# Patient Record
Sex: MALE | Race: WHITE | NOT HISPANIC OR LATINO | Employment: UNEMPLOYED | URBAN - METROPOLITAN AREA
[De-identification: names, ages, dates, MRNs, and addresses within clinical notes are randomized per-mention and may not be internally consistent; named-entity substitution may affect disease eponyms.]

---

## 2023-09-19 ENCOUNTER — HOSPITAL ENCOUNTER (INPATIENT)
Facility: CLINIC | Age: 63
LOS: 3 days | Discharge: LEFT BEFORE DISCHARGE | End: 2023-09-22
Attending: EMERGENCY MEDICINE | Admitting: INTERNAL MEDICINE
Payer: COMMERCIAL

## 2023-09-19 ENCOUNTER — APPOINTMENT (OUTPATIENT)
Dept: ULTRASOUND IMAGING | Facility: CLINIC | Age: 63
End: 2023-09-19
Attending: INTERNAL MEDICINE
Payer: COMMERCIAL

## 2023-09-19 ENCOUNTER — APPOINTMENT (OUTPATIENT)
Dept: GENERAL RADIOLOGY | Facility: CLINIC | Age: 63
End: 2023-09-19
Attending: EMERGENCY MEDICINE
Payer: COMMERCIAL

## 2023-09-19 DIAGNOSIS — M86.9 OSTEOMYELITIS OF GREAT TOE OF LEFT FOOT (H): ICD-10-CM

## 2023-09-19 LAB
ANION GAP SERPL CALCULATED.3IONS-SCNC: 15 MMOL/L (ref 7–15)
BASE EXCESS BLDV CALC-SCNC: 1.1 MMOL/L (ref -7.7–1.9)
BASOPHILS # BLD AUTO: 0.1 10E3/UL (ref 0–0.2)
BASOPHILS NFR BLD AUTO: 1 %
BUN SERPL-MCNC: 18.5 MG/DL (ref 8–23)
CALCIUM SERPL-MCNC: 9.8 MG/DL (ref 8.8–10.2)
CHLORIDE SERPL-SCNC: 96 MMOL/L (ref 98–107)
CREAT SERPL-MCNC: 0.79 MG/DL (ref 0.67–1.17)
DEPRECATED HCO3 PLAS-SCNC: 22 MMOL/L (ref 22–29)
EGFRCR SERPLBLD CKD-EPI 2021: >90 ML/MIN/1.73M2
EOSINOPHIL # BLD AUTO: 0.2 10E3/UL (ref 0–0.7)
EOSINOPHIL NFR BLD AUTO: 2 %
ERYTHROCYTE [DISTWIDTH] IN BLOOD BY AUTOMATED COUNT: 12.2 % (ref 10–15)
GLUCOSE SERPL-MCNC: 219 MG/DL (ref 70–99)
HCO3 BLDV-SCNC: 25 MMOL/L (ref 21–28)
HCT VFR BLD AUTO: 48.4 % (ref 40–53)
HGB BLD-MCNC: 17.3 G/DL (ref 13.3–17.7)
HOLD SPECIMEN: NORMAL
HOLD SPECIMEN: NORMAL
IMM GRANULOCYTES # BLD: 0.3 10E3/UL
IMM GRANULOCYTES NFR BLD: 2 %
LACTATE SERPL-SCNC: 1.5 MMOL/L (ref 0.7–2)
LACTATE SERPL-SCNC: 3.2 MMOL/L (ref 0.7–2)
LYMPHOCYTES # BLD AUTO: 2.6 10E3/UL (ref 0.8–5.3)
LYMPHOCYTES NFR BLD AUTO: 18 %
MCH RBC QN AUTO: 33 PG (ref 26.5–33)
MCHC RBC AUTO-ENTMCNC: 35.7 G/DL (ref 31.5–36.5)
MCV RBC AUTO: 92 FL (ref 78–100)
MONOCYTES # BLD AUTO: 0.9 10E3/UL (ref 0–1.3)
MONOCYTES NFR BLD AUTO: 6 %
NEUTROPHILS # BLD AUTO: 10.1 10E3/UL (ref 1.6–8.3)
NEUTROPHILS NFR BLD AUTO: 71 %
NRBC # BLD AUTO: 0 10E3/UL
NRBC BLD AUTO-RTO: 0 /100
O2/TOTAL GAS SETTING VFR VENT: 0 %
OXYHGB MFR BLDV: 72 % (ref 70–75)
PCO2 BLDV: 39 MM HG (ref 40–50)
PH BLDV: 7.43 [PH] (ref 7.32–7.43)
PLATELET # BLD AUTO: 233 10E3/UL (ref 150–450)
PO2 BLDV: 39 MM HG (ref 25–47)
POTASSIUM SERPL-SCNC: 4.4 MMOL/L (ref 3.4–5.3)
RBC # BLD AUTO: 5.24 10E6/UL (ref 4.4–5.9)
SODIUM SERPL-SCNC: 133 MMOL/L (ref 136–145)
WBC # BLD AUTO: 14.2 10E3/UL (ref 4–11)

## 2023-09-19 PROCEDURE — 73660 X-RAY EXAM OF TOE(S): CPT | Mod: LT

## 2023-09-19 PROCEDURE — 250N000011 HC RX IP 250 OP 636: Mod: JZ | Performed by: INTERNAL MEDICINE

## 2023-09-19 PROCEDURE — 36415 COLL VENOUS BLD VENIPUNCTURE: CPT | Performed by: STUDENT IN AN ORGANIZED HEALTH CARE EDUCATION/TRAINING PROGRAM

## 2023-09-19 PROCEDURE — 120N000001 HC R&B MED SURG/OB

## 2023-09-19 PROCEDURE — 80048 BASIC METABOLIC PNL TOTAL CA: CPT | Performed by: EMERGENCY MEDICINE

## 2023-09-19 PROCEDURE — 96365 THER/PROPH/DIAG IV INF INIT: CPT

## 2023-09-19 PROCEDURE — 250N000013 HC RX MED GY IP 250 OP 250 PS 637: Performed by: EMERGENCY MEDICINE

## 2023-09-19 PROCEDURE — 258N000003 HC RX IP 258 OP 636: Performed by: INTERNAL MEDICINE

## 2023-09-19 PROCEDURE — 99285 EMERGENCY DEPT VISIT HI MDM: CPT | Mod: 25

## 2023-09-19 PROCEDURE — 85025 COMPLETE CBC W/AUTO DIFF WBC: CPT | Performed by: STUDENT IN AN ORGANIZED HEALTH CARE EDUCATION/TRAINING PROGRAM

## 2023-09-19 PROCEDURE — 250N000011 HC RX IP 250 OP 636: Performed by: EMERGENCY MEDICINE

## 2023-09-19 PROCEDURE — 85025 COMPLETE CBC W/AUTO DIFF WBC: CPT | Performed by: EMERGENCY MEDICINE

## 2023-09-19 PROCEDURE — 87040 BLOOD CULTURE FOR BACTERIA: CPT | Performed by: EMERGENCY MEDICINE

## 2023-09-19 PROCEDURE — 82310 ASSAY OF CALCIUM: CPT | Performed by: STUDENT IN AN ORGANIZED HEALTH CARE EDUCATION/TRAINING PROGRAM

## 2023-09-19 PROCEDURE — 99223 1ST HOSP IP/OBS HIGH 75: CPT | Performed by: INTERNAL MEDICINE

## 2023-09-19 PROCEDURE — 36415 COLL VENOUS BLD VENIPUNCTURE: CPT | Performed by: EMERGENCY MEDICINE

## 2023-09-19 PROCEDURE — 83605 ASSAY OF LACTIC ACID: CPT | Performed by: INTERNAL MEDICINE

## 2023-09-19 PROCEDURE — 83605 ASSAY OF LACTIC ACID: CPT | Performed by: EMERGENCY MEDICINE

## 2023-09-19 PROCEDURE — 36415 COLL VENOUS BLD VENIPUNCTURE: CPT | Performed by: INTERNAL MEDICINE

## 2023-09-19 PROCEDURE — 258N000003 HC RX IP 258 OP 636: Performed by: EMERGENCY MEDICINE

## 2023-09-19 PROCEDURE — 82805 BLOOD GASES W/O2 SATURATION: CPT | Performed by: EMERGENCY MEDICINE

## 2023-09-19 PROCEDURE — 93926 LOWER EXTREMITY STUDY: CPT | Mod: LT

## 2023-09-19 PROCEDURE — 250N000013 HC RX MED GY IP 250 OP 250 PS 637: Performed by: INTERNAL MEDICINE

## 2023-09-19 RX ORDER — HYDROXYZINE HYDROCHLORIDE 25 MG/1
50 TABLET, FILM COATED ORAL EVERY 6 HOURS PRN
Status: DISCONTINUED | OUTPATIENT
Start: 2023-09-19 | End: 2023-09-22 | Stop reason: HOSPADM

## 2023-09-19 RX ORDER — AMOXICILLIN 250 MG
2 CAPSULE ORAL 2 TIMES DAILY PRN
Status: DISCONTINUED | OUTPATIENT
Start: 2023-09-19 | End: 2023-09-22 | Stop reason: HOSPADM

## 2023-09-19 RX ORDER — CEFTRIAXONE 1 G/1
1 INJECTION, POWDER, FOR SOLUTION INTRAMUSCULAR; INTRAVENOUS EVERY 24 HOURS
Status: DISCONTINUED | OUTPATIENT
Start: 2023-09-20 | End: 2023-09-20

## 2023-09-19 RX ORDER — POLYETHYLENE GLYCOL 3350 17 G/17G
17 POWDER, FOR SOLUTION ORAL DAILY PRN
Status: DISCONTINUED | OUTPATIENT
Start: 2023-09-19 | End: 2023-09-22 | Stop reason: HOSPADM

## 2023-09-19 RX ORDER — AMLODIPINE BESYLATE 10 MG/1
10 TABLET ORAL DAILY
Status: DISCONTINUED | OUTPATIENT
Start: 2023-09-20 | End: 2023-09-22 | Stop reason: HOSPADM

## 2023-09-19 RX ORDER — AMLODIPINE BESYLATE 5 MG/1
5 TABLET ORAL DAILY
COMMUNITY
Start: 2023-08-31 | End: 2024-08-30

## 2023-09-19 RX ORDER — GABAPENTIN 600 MG/1
1200 TABLET ORAL AT BEDTIME
Status: DISCONTINUED | OUTPATIENT
Start: 2023-09-19 | End: 2023-09-22 | Stop reason: HOSPADM

## 2023-09-19 RX ORDER — SODIUM CHLORIDE 9 MG/ML
INJECTION, SOLUTION INTRAVENOUS CONTINUOUS
Status: DISCONTINUED | OUTPATIENT
Start: 2023-09-19 | End: 2023-09-20

## 2023-09-19 RX ORDER — ONDANSETRON 4 MG/1
4 TABLET, ORALLY DISINTEGRATING ORAL EVERY 6 HOURS PRN
Status: DISCONTINUED | OUTPATIENT
Start: 2023-09-19 | End: 2023-09-21

## 2023-09-19 RX ORDER — DEXTROSE MONOHYDRATE 25 G/50ML
25-50 INJECTION, SOLUTION INTRAVENOUS
Status: DISCONTINUED | OUTPATIENT
Start: 2023-09-19 | End: 2023-09-22

## 2023-09-19 RX ORDER — CEFTRIAXONE 2 G/1
2 INJECTION, POWDER, FOR SOLUTION INTRAMUSCULAR; INTRAVENOUS ONCE
Status: COMPLETED | OUTPATIENT
Start: 2023-09-19 | End: 2023-09-19

## 2023-09-19 RX ORDER — AMOXICILLIN 250 MG
1 CAPSULE ORAL 2 TIMES DAILY PRN
Status: DISCONTINUED | OUTPATIENT
Start: 2023-09-19 | End: 2023-09-22 | Stop reason: HOSPADM

## 2023-09-19 RX ORDER — ASPIRIN 325 MG
325 TABLET ORAL DAILY
Status: DISCONTINUED | OUTPATIENT
Start: 2023-09-20 | End: 2023-09-19

## 2023-09-19 RX ORDER — TADALAFIL 20 MG/1
10-20 TABLET ORAL
Status: ON HOLD | COMMUNITY
End: 2023-09-22

## 2023-09-19 RX ORDER — NALOXONE HYDROCHLORIDE 0.4 MG/ML
0.2 INJECTION, SOLUTION INTRAMUSCULAR; INTRAVENOUS; SUBCUTANEOUS
Status: DISCONTINUED | OUTPATIENT
Start: 2023-09-19 | End: 2023-09-22 | Stop reason: HOSPADM

## 2023-09-19 RX ORDER — LEVOTHYROXINE SODIUM 137 UG/1
137 TABLET ORAL
Status: DISCONTINUED | OUTPATIENT
Start: 2023-09-21 | End: 2023-09-22 | Stop reason: HOSPADM

## 2023-09-19 RX ORDER — HYDRALAZINE HYDROCHLORIDE 25 MG/1
25 TABLET, FILM COATED ORAL 4 TIMES DAILY PRN
Status: DISCONTINUED | OUTPATIENT
Start: 2023-09-19 | End: 2023-09-22 | Stop reason: HOSPADM

## 2023-09-19 RX ORDER — DOXYCYCLINE 100 MG/1
100 CAPSULE ORAL 2 TIMES DAILY
Status: ON HOLD | COMMUNITY
Start: 2023-09-13 | End: 2023-09-22

## 2023-09-19 RX ORDER — ROSUVASTATIN CALCIUM 5 MG/1
5 TABLET, COATED ORAL DAILY
Status: DISCONTINUED | OUTPATIENT
Start: 2023-09-20 | End: 2023-09-22 | Stop reason: HOSPADM

## 2023-09-19 RX ORDER — GABAPENTIN 600 MG/1
600 TABLET ORAL DAILY PRN
COMMUNITY

## 2023-09-19 RX ORDER — ROSUVASTATIN CALCIUM 5 MG/1
5 TABLET, COATED ORAL
COMMUNITY
Start: 2023-06-26 | End: 2024-06-25

## 2023-09-19 RX ORDER — LEVOTHYROXINE SODIUM 100 UG/1
200 TABLET ORAL WEEKLY
Status: DISCONTINUED | OUTPATIENT
Start: 2023-09-20 | End: 2023-09-22 | Stop reason: HOSPADM

## 2023-09-19 RX ORDER — OXYCODONE HYDROCHLORIDE 5 MG/1
5 TABLET ORAL EVERY 4 HOURS PRN
Status: DISCONTINUED | OUTPATIENT
Start: 2023-09-19 | End: 2023-09-19

## 2023-09-19 RX ORDER — LEVOTHYROXINE SODIUM 137 UG/1
TABLET ORAL
COMMUNITY
Start: 2023-09-01

## 2023-09-19 RX ORDER — HYDROXYZINE HYDROCHLORIDE 25 MG/1
25 TABLET, FILM COATED ORAL EVERY 6 HOURS PRN
Status: DISCONTINUED | OUTPATIENT
Start: 2023-09-19 | End: 2023-09-22 | Stop reason: HOSPADM

## 2023-09-19 RX ORDER — SPIRONOLACTONE 50 MG/1
50 TABLET, FILM COATED ORAL 2 TIMES DAILY
COMMUNITY
Start: 2023-06-26

## 2023-09-19 RX ORDER — LIDOCAINE 40 MG/G
CREAM TOPICAL
Status: DISCONTINUED | OUTPATIENT
Start: 2023-09-19 | End: 2023-09-22 | Stop reason: HOSPADM

## 2023-09-19 RX ORDER — ACETAMINOPHEN 325 MG/1
975 TABLET ORAL 4 TIMES DAILY
Status: DISCONTINUED | OUTPATIENT
Start: 2023-09-19 | End: 2023-09-19

## 2023-09-19 RX ORDER — SPIRONOLACTONE 25 MG/1
50 TABLET ORAL 2 TIMES DAILY
Status: DISCONTINUED | OUTPATIENT
Start: 2023-09-19 | End: 2023-09-22 | Stop reason: DRUGHIGH

## 2023-09-19 RX ORDER — HYDROCODONE BITARTRATE AND ACETAMINOPHEN 5; 325 MG/1; MG/1
1-2 TABLET ORAL EVERY 6 HOURS PRN
Status: DISCONTINUED | OUTPATIENT
Start: 2023-09-19 | End: 2023-09-21

## 2023-09-19 RX ORDER — NALOXONE HYDROCHLORIDE 0.4 MG/ML
0.4 INJECTION, SOLUTION INTRAMUSCULAR; INTRAVENOUS; SUBCUTANEOUS
Status: DISCONTINUED | OUTPATIENT
Start: 2023-09-19 | End: 2023-09-22 | Stop reason: HOSPADM

## 2023-09-19 RX ORDER — ASPIRIN 325 MG
325 TABLET ORAL
COMMUNITY
End: 2023-09-19

## 2023-09-19 RX ORDER — CLINDAMYCIN PHOSPHATE 900 MG/50ML
900 INJECTION, SOLUTION INTRAVENOUS EVERY 8 HOURS
Status: DISCONTINUED | OUTPATIENT
Start: 2023-09-19 | End: 2023-09-20

## 2023-09-19 RX ORDER — GABAPENTIN 600 MG/1
1200 TABLET ORAL AT BEDTIME
COMMUNITY
Start: 2021-10-12

## 2023-09-19 RX ORDER — ONDANSETRON 2 MG/ML
4 INJECTION INTRAMUSCULAR; INTRAVENOUS EVERY 6 HOURS PRN
Status: DISCONTINUED | OUTPATIENT
Start: 2023-09-19 | End: 2023-09-21

## 2023-09-19 RX ORDER — NICOTINE 21 MG/24HR
1 PATCH, TRANSDERMAL 24 HOURS TRANSDERMAL DAILY
Status: DISCONTINUED | OUTPATIENT
Start: 2023-09-19 | End: 2023-09-22 | Stop reason: HOSPADM

## 2023-09-19 RX ORDER — NICOTINE POLACRILEX 4 MG
15-30 LOZENGE BUCCAL
Status: DISCONTINUED | OUTPATIENT
Start: 2023-09-19 | End: 2023-09-22

## 2023-09-19 RX ADMIN — NICOTINE 1 PATCH: 14 PATCH, EXTENDED RELEASE TRANSDERMAL at 18:36

## 2023-09-19 RX ADMIN — APIXABAN 5 MG: 5 TABLET, FILM COATED ORAL at 22:57

## 2023-09-19 RX ADMIN — SODIUM CHLORIDE 1000 ML: 9 INJECTION, SOLUTION INTRAVENOUS at 20:33

## 2023-09-19 RX ADMIN — CEFTRIAXONE 2 G: 2 INJECTION, POWDER, FOR SOLUTION INTRAMUSCULAR; INTRAVENOUS at 19:27

## 2023-09-19 RX ADMIN — VANCOMYCIN HYDROCHLORIDE 1750 MG: 5 INJECTION, POWDER, LYOPHILIZED, FOR SOLUTION INTRAVENOUS at 20:37

## 2023-09-19 RX ADMIN — CLINDAMYCIN PHOSPHATE 900 MG: 900 INJECTION, SOLUTION INTRAVENOUS at 23:52

## 2023-09-19 RX ADMIN — SODIUM CHLORIDE 1000 ML: 9 INJECTION, SOLUTION INTRAVENOUS at 19:19

## 2023-09-19 RX ADMIN — ACETAMINOPHEN 975 MG: 325 TABLET, FILM COATED ORAL at 22:56

## 2023-09-19 RX ADMIN — GABAPENTIN 1200 MG: 600 TABLET, FILM COATED ORAL at 23:11

## 2023-09-19 RX ADMIN — HYDRALAZINE HYDROCHLORIDE 25 MG: 25 TABLET, FILM COATED ORAL at 22:57

## 2023-09-19 RX ADMIN — SODIUM CHLORIDE: 9 INJECTION, SOLUTION INTRAVENOUS at 22:44

## 2023-09-19 RX ADMIN — SPIRONOLACTONE 50 MG: 25 TABLET ORAL at 23:11

## 2023-09-19 ASSESSMENT — ACTIVITIES OF DAILY LIVING (ADL)
DIFFICULTY_COMMUNICATING: NO
CONCENTRATING,_REMEMBERING_OR_MAKING_DECISIONS_DIFFICULTY: NO
DRESSING/BATHING_DIFFICULTY: NO
CHANGE_IN_FUNCTIONAL_STATUS_SINCE_ONSET_OF_CURRENT_ILLNESS/INJURY: NO
WALKING_OR_CLIMBING_STAIRS_DIFFICULTY: NO
DOING_ERRANDS_INDEPENDENTLY_DIFFICULTY: NO
DIFFICULTY_EATING/SWALLOWING: NO
ADLS_ACUITY_SCORE: 35
HEARING_DIFFICULTY_OR_DEAF: NO
FALL_HISTORY_WITHIN_LAST_SIX_MONTHS: NO
ADLS_ACUITY_SCORE: 35
ADLS_ACUITY_SCORE: 35
WEAR_GLASSES_OR_BLIND: YES
TOILETING_ISSUES: NO

## 2023-09-19 NOTE — ED TRIAGE NOTES
Pt. Presents to ED with complaints of L great toe infection that started last Monday, was seen at [facility] on Tuesday and was given ABX which he started on Wednesday. On the 6th day of ABX today. Using iodine and triple antibiotic and bandaging. Reports still draining pus and blood and remains swollen. AVSS on RA. A & O x 4, independent. Denies fevers. Diabetic.

## 2023-09-20 ENCOUNTER — APPOINTMENT (OUTPATIENT)
Dept: MRI IMAGING | Facility: CLINIC | Age: 63
End: 2023-09-20
Attending: INTERNAL MEDICINE
Payer: COMMERCIAL

## 2023-09-20 ENCOUNTER — ANESTHESIA (OUTPATIENT)
Dept: SURGERY | Facility: CLINIC | Age: 63
End: 2023-09-20
Payer: COMMERCIAL

## 2023-09-20 ENCOUNTER — ANESTHESIA EVENT (OUTPATIENT)
Dept: SURGERY | Facility: CLINIC | Age: 63
End: 2023-09-20
Payer: COMMERCIAL

## 2023-09-20 ENCOUNTER — APPOINTMENT (OUTPATIENT)
Dept: ULTRASOUND IMAGING | Facility: CLINIC | Age: 63
End: 2023-09-20
Attending: HOSPITALIST
Payer: COMMERCIAL

## 2023-09-20 LAB
ANION GAP SERPL CALCULATED.3IONS-SCNC: 6 MMOL/L (ref 7–15)
APTT PPP: 40 SECONDS (ref 22–38)
BUN SERPL-MCNC: 12.3 MG/DL (ref 8–23)
CALCIUM SERPL-MCNC: 9 MG/DL (ref 8.8–10.2)
CHLORIDE SERPL-SCNC: 105 MMOL/L (ref 98–107)
CREAT SERPL-MCNC: 0.86 MG/DL (ref 0.67–1.17)
DEPRECATED HCO3 PLAS-SCNC: 25 MMOL/L (ref 22–29)
EGFRCR SERPLBLD CKD-EPI 2021: >90 ML/MIN/1.73M2
ERYTHROCYTE [DISTWIDTH] IN BLOOD BY AUTOMATED COUNT: 12.3 % (ref 10–15)
ERYTHROCYTE [DISTWIDTH] IN BLOOD BY AUTOMATED COUNT: 12.4 % (ref 10–15)
GLUCOSE BLDC GLUCOMTR-MCNC: 129 MG/DL (ref 70–99)
GLUCOSE BLDC GLUCOMTR-MCNC: 130 MG/DL (ref 70–99)
GLUCOSE BLDC GLUCOMTR-MCNC: 139 MG/DL (ref 70–99)
GLUCOSE BLDC GLUCOMTR-MCNC: 151 MG/DL (ref 70–99)
GLUCOSE BLDC GLUCOMTR-MCNC: 205 MG/DL (ref 70–99)
GLUCOSE BLDC GLUCOMTR-MCNC: 219 MG/DL (ref 70–99)
GLUCOSE SERPL-MCNC: 140 MG/DL (ref 70–99)
HCT VFR BLD AUTO: 43.2 % (ref 40–53)
HCT VFR BLD AUTO: 44.6 % (ref 40–53)
HGB BLD-MCNC: 14.5 G/DL (ref 13.3–17.7)
HGB BLD-MCNC: 15.4 G/DL (ref 13.3–17.7)
LACTATE SERPL-SCNC: 2.3 MMOL/L (ref 0.7–2)
MCH RBC QN AUTO: 32.6 PG (ref 26.5–33)
MCH RBC QN AUTO: 32.7 PG (ref 26.5–33)
MCHC RBC AUTO-ENTMCNC: 33.6 G/DL (ref 31.5–36.5)
MCHC RBC AUTO-ENTMCNC: 34.5 G/DL (ref 31.5–36.5)
MCV RBC AUTO: 95 FL (ref 78–100)
MCV RBC AUTO: 97 FL (ref 78–100)
MRSA DNA SPEC QL NAA+PROBE: NEGATIVE
PLATELET # BLD AUTO: 200 10E3/UL (ref 150–450)
PLATELET # BLD AUTO: 210 10E3/UL (ref 150–450)
POTASSIUM SERPL-SCNC: 4.9 MMOL/L (ref 3.4–5.3)
RBC # BLD AUTO: 4.45 10E6/UL (ref 4.4–5.9)
RBC # BLD AUTO: 4.71 10E6/UL (ref 4.4–5.9)
SA TARGET DNA: NEGATIVE
SODIUM SERPL-SCNC: 136 MMOL/L (ref 136–145)
WBC # BLD AUTO: 14 10E3/UL (ref 4–11)
WBC # BLD AUTO: 15.1 10E3/UL (ref 4–11)

## 2023-09-20 PROCEDURE — 36415 COLL VENOUS BLD VENIPUNCTURE: CPT | Performed by: HOSPITALIST

## 2023-09-20 PROCEDURE — 255N000002 HC RX 255 OP 636: Performed by: INTERNAL MEDICINE

## 2023-09-20 PROCEDURE — 250N000011 HC RX IP 250 OP 636: Performed by: INTERNAL MEDICINE

## 2023-09-20 PROCEDURE — A9585 GADOBUTROL INJECTION: HCPCS | Performed by: INTERNAL MEDICINE

## 2023-09-20 PROCEDURE — 250N000013 HC RX MED GY IP 250 OP 250 PS 637: Performed by: INTERNAL MEDICINE

## 2023-09-20 PROCEDURE — 120N000001 HC R&B MED SURG/OB

## 2023-09-20 PROCEDURE — 250N000011 HC RX IP 250 OP 636: Mod: JZ | Performed by: HOSPITALIST

## 2023-09-20 PROCEDURE — 99233 SBSQ HOSP IP/OBS HIGH 50: CPT | Performed by: HOSPITALIST

## 2023-09-20 PROCEDURE — 85027 COMPLETE CBC AUTOMATED: CPT | Performed by: INTERNAL MEDICINE

## 2023-09-20 PROCEDURE — 99418 PROLNG IP/OBS E/M EA 15 MIN: CPT | Performed by: HOSPITALIST

## 2023-09-20 PROCEDURE — 87641 MR-STAPH DNA AMP PROBE: CPT | Performed by: HOSPITALIST

## 2023-09-20 PROCEDURE — 93922 UPR/L XTREMITY ART 2 LEVELS: CPT

## 2023-09-20 PROCEDURE — 36415 COLL VENOUS BLD VENIPUNCTURE: CPT | Performed by: INTERNAL MEDICINE

## 2023-09-20 PROCEDURE — 82310 ASSAY OF CALCIUM: CPT | Performed by: INTERNAL MEDICINE

## 2023-09-20 PROCEDURE — 999N000141 HC STATISTIC PRE-PROCEDURE NURSING ASSESSMENT: Performed by: PODIATRIST

## 2023-09-20 PROCEDURE — 85014 HEMATOCRIT: CPT | Performed by: HOSPITALIST

## 2023-09-20 PROCEDURE — 85730 THROMBOPLASTIN TIME PARTIAL: CPT | Performed by: HOSPITALIST

## 2023-09-20 PROCEDURE — 99254 IP/OBS CNSLTJ NEW/EST MOD 60: CPT | Performed by: PODIATRIST

## 2023-09-20 PROCEDURE — 83605 ASSAY OF LACTIC ACID: CPT | Performed by: INTERNAL MEDICINE

## 2023-09-20 PROCEDURE — 73720 MRI LWR EXTREMITY W/O&W/DYE: CPT | Mod: LT

## 2023-09-20 PROCEDURE — 73720 MRI LWR EXTREMITY W/O&W/DYE: CPT | Mod: 26 | Performed by: RADIOLOGY

## 2023-09-20 RX ORDER — PIPERACILLIN SODIUM, TAZOBACTAM SODIUM 3; .375 G/15ML; G/15ML
3.38 INJECTION, POWDER, LYOPHILIZED, FOR SOLUTION INTRAVENOUS EVERY 6 HOURS
Status: DISCONTINUED | OUTPATIENT
Start: 2023-09-20 | End: 2023-09-22 | Stop reason: HOSPADM

## 2023-09-20 RX ORDER — HEPARIN SODIUM 10000 [USP'U]/100ML
0-5000 INJECTION, SOLUTION INTRAVENOUS CONTINUOUS
Status: DISCONTINUED | OUTPATIENT
Start: 2023-09-20 | End: 2023-09-22 | Stop reason: HOSPADM

## 2023-09-20 RX ORDER — VANCOMYCIN HYDROCHLORIDE 1 G/200ML
1000 INJECTION, SOLUTION INTRAVENOUS EVERY 12 HOURS
Status: DISCONTINUED | OUTPATIENT
Start: 2023-09-20 | End: 2023-09-22

## 2023-09-20 RX ORDER — GADOBUTROL 604.72 MG/ML
7 INJECTION INTRAVENOUS ONCE
Status: COMPLETED | OUTPATIENT
Start: 2023-09-20 | End: 2023-09-20

## 2023-09-20 RX ORDER — SODIUM CHLORIDE, SODIUM LACTATE, POTASSIUM CHLORIDE, CALCIUM CHLORIDE 600; 310; 30; 20 MG/100ML; MG/100ML; MG/100ML; MG/100ML
INJECTION, SOLUTION INTRAVENOUS CONTINUOUS
Status: DISCONTINUED | OUTPATIENT
Start: 2023-09-20 | End: 2023-09-20 | Stop reason: HOSPADM

## 2023-09-20 RX ADMIN — GABAPENTIN 1200 MG: 600 TABLET, FILM COATED ORAL at 21:06

## 2023-09-20 RX ADMIN — HYDROCODONE BITARTRATE AND ACETAMINOPHEN 1 TABLET: 5; 325 TABLET ORAL at 06:32

## 2023-09-20 RX ADMIN — HYDROCODONE BITARTRATE AND ACETAMINOPHEN 1 TABLET: 5; 325 TABLET ORAL at 16:17

## 2023-09-20 RX ADMIN — PIPERACILLIN AND TAZOBACTAM 3.38 G: 3; .375 INJECTION, POWDER, FOR SOLUTION INTRAVENOUS at 09:25

## 2023-09-20 RX ADMIN — HEPARIN SODIUM 900 UNITS/HR: 10000 INJECTION, SOLUTION INTRAVENOUS at 09:40

## 2023-09-20 RX ADMIN — LEVOTHYROXINE SODIUM 200 MCG: 0.1 TABLET ORAL at 09:13

## 2023-09-20 RX ADMIN — GADOBUTROL 7 ML: 604.72 INJECTION INTRAVENOUS at 08:13

## 2023-09-20 RX ADMIN — ROSUVASTATIN CALCIUM 5 MG: 5 TABLET, FILM COATED ORAL at 09:14

## 2023-09-20 RX ADMIN — VANCOMYCIN HYDROCHLORIDE 1000 MG: 1 INJECTION, SOLUTION INTRAVENOUS at 10:06

## 2023-09-20 RX ADMIN — SPIRONOLACTONE 50 MG: 25 TABLET ORAL at 09:13

## 2023-09-20 RX ADMIN — PIPERACILLIN AND TAZOBACTAM 3.38 G: 3; .375 INJECTION, POWDER, FOR SOLUTION INTRAVENOUS at 22:16

## 2023-09-20 RX ADMIN — AMLODIPINE BESYLATE 10 MG: 10 TABLET ORAL at 09:13

## 2023-09-20 RX ADMIN — HYDROXYZINE HYDROCHLORIDE 25 MG: 25 TABLET, FILM COATED ORAL at 19:32

## 2023-09-20 RX ADMIN — PIPERACILLIN AND TAZOBACTAM 3.38 G: 3; .375 INJECTION, POWDER, FOR SOLUTION INTRAVENOUS at 16:15

## 2023-09-20 RX ADMIN — CLINDAMYCIN PHOSPHATE 900 MG: 900 INJECTION, SOLUTION INTRAVENOUS at 06:32

## 2023-09-20 RX ADMIN — VANCOMYCIN HYDROCHLORIDE 1000 MG: 1 INJECTION, SOLUTION INTRAVENOUS at 20:48

## 2023-09-20 RX ADMIN — NICOTINE 1 PATCH: 14 PATCH, EXTENDED RELEASE TRANSDERMAL at 09:12

## 2023-09-20 ASSESSMENT — ACTIVITIES OF DAILY LIVING (ADL)
ADLS_ACUITY_SCORE: 20

## 2023-09-20 ASSESSMENT — LIFESTYLE VARIABLES: TOBACCO_USE: 1

## 2023-09-20 NOTE — PHARMACY-ADMISSION MEDICATION HISTORY
Pharmacist Admission Medication History    Admission medication history is complete. The information provided in this note is only as accurate as the sources available at the time of the update.    Medication reconciliation/reorder completed by provider prior to medication history? No    Information Source(s): Patient via in-person    Pertinent Information:     Changes made to PTA medication list:  Added: Doxycycline, Tadalafil prn  Deleted: ASA  Changed: Gabapentin    Medication Affordability:     Allergies reviewed with patient and updates made in EHR: no      Medication History Completed By: Ko Champion RPH 9/19/2023 10:43 PM    Prior to Admission medications    Medication Sig Last Dose Taking? Auth Provider Long Term End Date   amLODIPine (NORVASC) 5 MG tablet Take 5 mg by mouth daily 9/19/2023 at am Yes Reported, Patient Yes 8/30/24   apixaban ANTICOAGULANT (ELIQUIS) 5 MG tablet Take 5 mg by mouth 2 times daily 9/19/2023 at am Yes Reported, Patient     doxycycline hyclate (VIBRAMYCIN) 100 MG capsule Take 100 mg by mouth 2 times daily 9/19/2023 at am Yes Unknown, Entered By History  9/22/23   empagliflozin (JARDIANCE) 25 MG TABS tablet Take 25 mg by mouth daily 9/19/2023 Yes Reported, Patient     gabapentin (NEURONTIN) 600 MG tablet Take 1,200 mg by mouth At Bedtime 9/18/2023 Yes Reported, Patient Yes    gabapentin (NEURONTIN) 600 MG tablet Take 600 mg by mouth daily as needed  Yes Unknown, Entered By History Yes    levothyroxine (SYNTHROID/LEVOTHROID) 137 MCG tablet Take 137mcg daily and add 1/2 tab once per week (Wed) 9/19/2023 at am Yes Reported, Patient Yes    metFORMIN (GLUCOPHAGE) 500 MG tablet Take 1,000 mg by mouth 2 times daily (with meals) 9/19/2023 at am Yes Reported, Patient Yes    rosuvastatin (CRESTOR) 5 MG tablet Take 5 mg by mouth daily (with breakfast) 9/19/2023 at am Yes Reported, Patient Yes 6/25/24   spironolactone (ALDACTONE) 50 MG tablet Take 50 mg by mouth 2 times daily  9/19/2023 at am Yes Reported, Patient Yes    tadalafil (ADCIRCA/CIALIS) 20 MG tablet Take 10-20 mg by mouth every 48 hours as needed TAKE 1/2 TO 1 (ONE-HALF TO ONE) TABLET BY MOUTH AS NEEDED 1-2 HOURS PRIOR TO SEXUAL ACTIVITY - NO MORE THAN 1 TABLET IN 48 HOURS. DO NOT COMBINE WITH NITRATES.   Unknown, Entered By History Yes

## 2023-09-20 NOTE — PROGRESS NOTES
"Podiatry Progress Note - UPDATE   -Xrs reviewed showing gas to distal hallux. Please keep patient NPO for scheduled amputation today.     -Chart reviewed regarding patient's peripheral arterial disease. Agree that transfer to [facility], to follow with current vascular surgeons is most appropriate. Would transfer after today's procedure. Plan for amputation and debridement for infection control. Will ultimately need second debridement and closure following vascular evaluation/optimization.      Muriel Moses DPM       Addendum 1210:   -Patient unwilling to come to preop to discuss foot infection.   -Phone call placed to patient where he states he would not be agreeable to an amputation. He's also not agreeable to any surgical intervention without \"being seen in his room.\" Discussed with him signs of sepsis and gas gangrene on xray and recommended expedited treatment with evaluation in preop prior to procedure. He declines this.   -Patient ended phone yelling at myself and hung up on me. States he'll leave AMA.   "

## 2023-09-20 NOTE — ED NOTES
"[facility]  ED Nurse Handoff Report    ED Chief complaint: Wound Check  . ED Diagnosis:   Final diagnoses:   Osteomyelitis of great toe of left foot (H)       Allergies:   Allergies   Allergen Reactions    Adenosine Anaphylaxis and Other (See Comments)     PN: Became unresponsive, unable to speak    Dobutamine Other (See Comments)     Fainting    Atorvastatin Muscle Pain (Myalgia)    Ibuprofen Other (See Comments)     tinnitus       Code Status: Full Code    Activity level - Baseline/Home:  independent.  Activity Level - Current:   independent.   Lift room needed: No.   Bariatric: No   Needed: No   Isolation: No.   Infection: Not Applicable.     Respiratory status: Room air    Vital Signs (within 30 minutes):   Vitals:    09/19/23 1717 09/19/23 1720 09/19/23 1900 09/19/23 1915   BP:  (!) 177/93 (!) 191/105 (!) 175/86   BP Location:  Right arm     Pulse:  78 72 67   Resp: 16 16     Temp: 97.5  F (36.4  C)      TempSrc: Temporal      SpO2:  97% 98% 98%   Weight:  76.2 kg (168 lb)     Height:  1.702 m (5' 7\")         Cardiac Rhythm:  ,      Pain level:    Patient confused: No.   Patient Falls Risk: nonskid shoes/slippers when out of bed, arm band in place, patient and family education, and activity supervised.   Elimination Status: Has voided     Patient Report - Initial Complaint: Wound on foot.   Focused Assessment: Pt. Presents to ED with complaints of L great toe infection that started last Monday, was seen at [facility] on Tuesday and was given ABX which he started on Wednesday. On the 6th day of ABX today. Using iodine and triple antibiotic and bandaging. Reports still draining pus and blood and remains swollen. AVSS on RA. A & O x 4, independent. Denies fevers. Diabetic.       Abnormal Results:   Labs Ordered and Resulted from Time of ED Arrival to Time of ED Departure   BASIC METABOLIC PANEL - Abnormal       Result Value    Sodium 133 (*)     Potassium 4.4      Chloride 96 " (*)     Carbon Dioxide (CO2) 22      Anion Gap 15      Urea Nitrogen 18.5      Creatinine 0.79      Calcium 9.8      Glucose 219 (*)     GFR Estimate >90     CBC WITH PLATELETS AND DIFFERENTIAL - Abnormal    WBC Count 14.2 (*)     RBC Count 5.24      Hemoglobin 17.3      Hematocrit 48.4      MCV 92      MCH 33.0      MCHC 35.7      RDW 12.2      Platelet Count 233      % Neutrophils 71      % Lymphocytes 18      % Monocytes 6      % Eosinophils 2      % Basophils 1      % Immature Granulocytes 2      NRBCs per 100 WBC 0      Absolute Neutrophils 10.1 (*)     Absolute Lymphocytes 2.6      Absolute Monocytes 0.9      Absolute Eosinophils 0.2      Absolute Basophils 0.1      Absolute Immature Granulocytes 0.3      Absolute NRBCs 0.0     BLOOD GAS VENOUS WITH OXYHEMOGLOBIN - Abnormal    pH Venous 7.43      pCO2 Venous 39 (*)     pO2 Venous 39      Bicarbonate Venous 25      FIO2 0      Oxyhemoglobin Venous 72      Base Excess/Deficit 1.1     LACTIC ACID WHOLE BLOOD - Abnormal    Lactic Acid 3.2 (*)    BLOOD CULTURE   BLOOD CULTURE        XR Toe Left G/E 2 Views   Final Result   IMPRESSION: There is soft tissue swelling, subcutaneous gas and thickening of the great toe near the distal phalanx. Soft tissue gas can be seen with direct extension from an area of ulceration as well as from a gas-forming organism/infection.    Correlation with physical exam findings is recommended. Subtle cortical irregularity and fragmentation of the cortex can be seen with trauma or osteomyelitis. If further evaluation is needed, recommend MRI.       NOTE: ABNORMAL REPORT      THE DICTATION ABOVE DESCRIBES AN ABNORMALITY FOR WHICH FOLLOW-UP IS NEEDED.           Treatments provided: see MAR  Family Comments: None  OBS brochure/video discussed/provided to patient:  Yes  ED Medications:   Medications   nicotine Patch in Place ( Transdermal Patch in Place 9/19/23 2151)   nicotine (NICODERM CQ) 14 MG/24HR 24 hr patch 1 patch (1 patch Transdermal  $Patch/Med Applied 9/19/23 1836)   cefTRIAXone (ROCEPHIN) 2 g vial to attach to  ml bag for ADULTS or NS 50 ml bag for PEDS (2 g Intravenous $New Bag 9/19/23 1927)   sodium chloride 0.9% BOLUS 1,000 mL (1,000 mLs Intravenous $New Bag 9/19/23 1919)   vancomycin (VANCOCIN) 1,750 mg in 0.9% NaCl 500 mL intermittent infusion (has no administration in time range)       Drips infusing:  No  For the majority of the shift this patient was Green.   Interventions performed were see MAR, Labs.    Sepsis treatment initiated: No    Cares/treatment/interventions/medications to be completed following ED care: none    ED Nurse Name: Mehnaz Dowd RN  7:34 PM    RECEIVING UNIT ED HANDOFF REVIEW    Above ED Nurse Handoff Report was reviewed: Yes  Reviewed by: Stephanie Baron RN on September 19, 2023 at 8:51 PM

## 2023-09-20 NOTE — PLAN OF CARE
"A&O x 4. Up independent. Norco effective for pain.     Heparin gtt running at 900 units/hr (PTT recheck at 2140). PIV x 2.     Plan NPO for surgery tomorrow with ortho    Pt left unit around 1500 with his wife's keys saying he is going out to smoke. Staff requested for him to wait to talk with his nurse and he refused and left. Wife was concerned pt was going to leave since he had her keys. Pt outside and willing to come back in with staff and escorted back to his room with staff.     Pt requesting to go out to smoke and stated he will unhook his heparin gtt himself and go. Pt starting to get agitated with staff. Dr. Arredondo notified and pt is unable to be unhooked to go out and smoke. Pt aware and become more verbally frustrated.   Right before pt being transferred off unit pts wife reported \"he has a history of becoming verbally aggressive with taking narcotics .\" Security escorted pt and staff up to new room. When pt arrived to room, pt started to slaming door and ask staff to leave.   Report given to Sakina MAHER  "

## 2023-09-20 NOTE — CONSULTS
[facility]  CONSULTATION NOTE    CHIEF COMPLAINT:      I was asked by Esau France MD  to evaluate this patient for left foot    PATIENT HISTORY:  Jae Meredith is a 63 year old male  with a past medical history significant for what's listed below. He presented for a worsening left foot infection and was found to have gas gangrene on xray. His wife states that the issue started after [place] ago. He also has a history of arterial disease with stenting done in 2022. He follows with vascular at [facility]. At bedside, he's quite agitated, which will be further documented below.         Review of Systems:  A 10 point review of systems was performed and is positive for that noted above in the patient history.  All other areas are negative.     PAST MEDICAL HISTORY: Diabetes Mellitus, Peripheral Arterial Disease, Hypertension, Noted smoking history      PAST SURGICAL HISTORY:   Past Surgical History:   Procedure Laterality Date    IR LOWER EXTREMITY ANGIOGRAM RIGHT  5/11/2022        MEDICATIONS:  Reviewed in Epic. Current.     ALLERGIES:    Allergies   Allergen Reactions    Adenosine Anaphylaxis and Other (See Comments)     PN: Became unresponsive, unable to speak    Dobutamine Other (See Comments)     Fainting    Atorvastatin Muscle Pain (Myalgia)    Ibuprofen Other (See Comments)     tinnitus        SOCIAL HISTORY:   Social History     Socioeconomic History    Marital status:      Spouse name: Not on file    Number of children: Not on file    Years of education: Not on file    Highest education level: Not on file   Occupational History    Not on file   Tobacco Use    Smoking status: Every Day     Types: Cigarettes    Smokeless tobacco: Not on file   Substance and Sexual Activity    Alcohol use: Not on file    Drug use: Not on file    Sexual activity: Not on file   Other Topics Concern    Not on file   Social History Narrative    Not on file     Social  "Determinants of Health     Financial Resource Strain: Not on file   Food Insecurity: Not on file   Transportation Needs: Not on file   Physical Activity: Not on file   Stress: Not on file   Social Connections: Not on file   Interpersonal Safety: Not on file   Housing Stability: Not on file        FAMILY HISTORY: History reviewed. No pertinent family history.     EXAM:Vitals: /64   Pulse 57   Temp 97.8  F (36.6  C) (Oral)   Resp 18   Ht 1.702 m (5' 7\")   Wt 73.5 kg (162 lb 0.6 oz)   SpO2 97%   BMI 25.38 kg/m    BMI= Body mass index is 25.38 kg/m .    LABS:     Last Comprehensive Metabolic Panel:  Sodium   Date Value Ref Range Status   09/20/2023 136 136 - 145 mmol/L Final     Potassium   Date Value Ref Range Status   09/20/2023 4.9 3.4 - 5.3 mmol/L Final     Chloride   Date Value Ref Range Status   09/20/2023 105 98 - 107 mmol/L Final     Carbon Dioxide (CO2)   Date Value Ref Range Status   09/20/2023 25 22 - 29 mmol/L Final     Anion Gap   Date Value Ref Range Status   09/20/2023 6 (L) 7 - 15 mmol/L Final     Glucose   Date Value Ref Range Status   09/20/2023 140 (H) 70 - 99 mg/dL Final     GLUCOSE BY METER POCT   Date Value Ref Range Status   09/20/2023 151 (H) 70 - 99 mg/dL Final     Urea Nitrogen   Date Value Ref Range Status   09/20/2023 12.3 8.0 - 23.0 mg/dL Final     Creatinine   Date Value Ref Range Status   09/20/2023 0.86 0.67 - 1.17 mg/dL Final     GFR Estimate   Date Value Ref Range Status   09/20/2023 >90 >60 mL/min/1.73m2 Final     Calcium   Date Value Ref Range Status   09/20/2023 9.0 8.8 - 10.2 mg/dL Final     Lab Results   Component Value Date    WBC 15.1 09/20/2023     Lab Results   Component Value Date    RBC 4.45 09/20/2023     Lab Results   Component Value Date    HGB 14.5 09/20/2023     Lab Results   Component Value Date    HCT 43.2 09/20/2023     Lab Results   Component Value Date     09/20/2023      No results found for: INR     General appearance: Patient is alert and " fully cooperative with history & exam.  No sign of distress is noted during the visit.      Respiratory: Breathing is regular & unlabored while sitting.      HEENT: Hearing is intact to spoken word.  Speech is clear.  No gross evidence of visual impairment that would impact ambulation.      Dermatologic: Left hallux: necrotic ulcer to lateral distal end with purulence. Fluctuance noted to distal digit. Cellulitis to base of digit. Plantar hallux ulcer also noted. Digit grossly malodorous.      Vascular: Dorsalis pedis and posterior tibial pulses are weakly palpable & regular bilaterally.  CFT and skin temperature is normal to both lower extremities.       Neurologic: Lower extremity sensation is diminished, bilateral foot, to light touch.  No evidence of neurological-based weakness or contracture in the lower extremities.       Musculoskeletal: Patient is ambulatory without an assistive device or brace.  No gross foot or ankle deformity noted.       Psychiatric: Affect is pleasant & appropriate.      All cultures:  Recent Labs   Lab 09/19/23  1833 09/19/23  1825   CULTURE No growth after 12 hours No growth after 12 hours        IMAGING:     IMPRESSION: There is soft tissue swelling, subcutaneous gas and thickening of the great toe near the distal phalanx. Soft tissue gas can be seen with direct extension from an area of ulceration as well as from a gas-forming organism/infection.   Correlation with physical exam findings is recommended. Subtle cortical irregularity and fragmentation of the cortex can be seen with trauma or osteomyelitis. If further evaluation is needed, recommend MRI.     Impression:  1. Imaging findings compatible with osteomyelitis involving the entire  first distal phalanx with erosive changes involving the tuft. No  evidence of osteomyelitis involvement of the first proximal phalanx.  2. Circumferential soft tissue edema and phlegmonous changes about the  first distal phalanx with several small  fluid collections with  incomplete rim enhancement, suggestive of developing abscess  formation.  3. Mild tenosynovitis of the flexor hallucis longus.    I personally reviewed the images and agree with the reports as stated.  Noted gas gangrene to distal digit. Osteomyelitis also present.     ASSESSMENT:  Left hallux gas gangrene and osteomyelitis   Peripheral Arterial Disease   Diabetes Mellitus      MEDICAL DECISION MAKING:   -Discussed all findings with patient. Chart and imaging reviewed.     -Patient's chart and imaging reviewed early this morning, showing gas gangrene, osteomyelitis and sepsis on admission. Was initially scheduled for an amputation, but patient unwilling to come to preop without being evaluated in his room. Phone was placed discussing all results and with plans to discuss further in preop prior to procedure. Patient at that time yelled at myself and hung up on me. Procedure was then cancelled.     -Patient then stated that he would consider having the procedure and was rescheduled. Again discussed findings with patient and wife at bedside for an extended period of time. His wife is in agreement with procedure, but patient is not, stating he will not agree to procedure here and wants to be transferred to [facility].     -Discussed with patient that his disrespect is not appropriate.     -Recommend transfer for further evaluation of his left foot. Discussed risks which include worsening foot infection, need for further surgery and overall worsening infection/sepsis. He understands these risks and is willing to wait for surgery.     -No further recommendations. Would decline any further consults. It patient remains admitted for an extended period of time, could consider a second opinion with ortho.       Thank you for the consultation request and the opportunity to participate in the care of Jae Moses DPM   [place]le  Surgery  [phone]

## 2023-09-20 NOTE — PLAN OF CARE
Orthopedic Plan of Care:    The patient's history and clinical/diagnostic findings were reviewed with the on-call orthopedic trauma surgeon. The patient is a candidate for a left hallux amputation. This will be scheduled for 9/21/2023 pending surgical optimization by hospital team. Dr. Marco Daniel will discuss the risks, benefits, and outcomes of surgery while obtaining consent.       NPO at midnight.  Continue pain regimen.  Continue IV abx  Anticoagulation: hold if any  Type and screen ordered.    Please contact orthopedic trauma team if any questions or concerns arise.

## 2023-09-20 NOTE — PROGRESS NOTES
Patient does not want to have the procedure done at this time and is unhappy with the way the procedure was presented to him. Patient being sent back to his room on the 4th floor. At this time the patient stated he wants to be transferred to a different hospital, preferably [facility].

## 2023-09-20 NOTE — PLAN OF CARE
Heparin gtt stopped at 1245 for possible surgery. Pt refused surgery and heparin gtt restarted at 1511- Per pharmacy Adry gregg to restart heparin gtt at previous rate and re-time the next PTT 6 hours the time of the restart

## 2023-09-20 NOTE — PROGRESS NOTES
[facility]    Medicine Progress Note - Hospitalist Service    Date of Admission:  9/19/2023    Assessment & Plan    Jae Meredith is a 63 year old male w/PMH DMT2, polyneuropathy related to diabetes, hypertension, hypothyroidism, hyper triglyceridemia, severe peripheral arterial disease s/p multiple stents         Left great toe osteomyelitis/cellulitis/abscess   Medical non compliance  sepsis  -Patient has a severe infection of his left great toe which extends with erythema to the dorsum of his left foot and x-ray is showing gas formation and likely osteomyelitis.  -mild leukocytosis, mild but variable lactic acid but vitals stable  -MRI showing osteo entire first distal phalanx and soft tissue findings c/w developing abscess first distal phalanx  -initially on Vanc/Rocephin then given Clinda but will change to IV Vanc and Zosyn and await blood cx  -podiatry consulted but patient became belligerent and refused surgery. Plan is to transfer and has been accepted but could be another 1-2 days (or more) and would benefit from source control. Will consult ortho for amputation  -change eliquis to heparin drip in anticipation of surgery     Severe peripheral arterial disease/chronic anticoagulation  High grade stenosis Proximal SFA on L  -Patient is followed at [facility] with Dr. Saba vascular surgery.  Is on chronic Eliquis as well as aspirin for this.    -Reviewing vascular notes he has a history of an aorta femoral bypass in 2014, a right ileal profunda femoral graft with kissing stents of the aorta and the femoral arteries 5/12/2022.    -US with stenosis as noted above, discussed with vascular surgery team and CLARISSE's have been ordered per their request  -holding home DOAC and on heparin drip, cont ASA     DMT2 with polyneuropathy  -holding home metformin/jardiance with expected dye studies, surgery  -cont ISS     Uncontrolled hypertension  -improved, cont norvasc. Holding home  aldactone     Hypothyroidism  -To continue on Synthroid     Hypertriglyceridemia  -cont statin, has refused fenofibrate in past      Ongoing tobacco abuse  -Patient will be given nicotine patch.  Patient needs to quit smoking but he has no intention of this she has been well documented with his primary and vascular consultants..     Primary aldosteronism  -hold spironolactone to avoid WES with nephrotoxic meds     History of fatty liver       Diet: NPO for Medical/Clinical Reasons Except for: Meds    DVT Prophylaxis: heparin drip  Adam Catheter: Not present  Lines: None     Cardiac Monitoring: None  Code Status: Full Code        Disposition Plan   Await transfer and possibly surgery here          Naresh Arredondo DO  Hospitalist Service  [facility]  Securely message with [employer] (more info)  Text page via [employer] Paging/Directory   ______________________________________________________________________    Interval History   Patient had argument with podiatry and was agitated and misinformative with staff. Had multiple discussions with him earlier discussing MRI findings and plan of care. Later he denied these conversations in presence of wife and denigrated staff and care so far.     Multiple calls and over 40 minutes spent to arrange a transfer to [facility] where his vascular surgery team is. He has been accepted but transit will likely be delayed due to bed shortage.    Physical Exam   Vital Signs: Temp: 97.8  F (36.6  C) Temp src: Oral BP: 122/64 Pulse: 57   Resp: 18 SpO2: 97 % O2 Device: None (Room air)    Weight: 162 lbs .61 oz    Constitutional: awake, alert, and agitated   Eyes: pupils equal, round and reactive to light and conjunctiva normal  ENT: normocepalic, without obvious abnormality, atramatic  Respiratory: no increased work of breathing, good air exchange, and no retractions  Cardiovascular: regular rate and rhythm and no murmur noted  GI: normal bowel sounds, soft, and  non-distended  Skin: L big toe with skin changes noted- palpation not done due to patient agitation  Neurologic: alert, oriented, no focal deficits noted    65 MINUTES SPENT BY ME on the date of service doing chart review, history, exam, documentation & further activities per the note.      Data   ------------------------- PAST 24 HR DATA REVIEWED -----------------------------------------------    I have personally reviewed the following data over the past 24 hrs:    15.1 (H)  \   14.5   / 200     136 105 12.3 /  130 (H)   4.9 25 0.86 \     Procal: N/A CRP: N/A Lactic Acid: 2.3 (H)         Imaging results reviewed over the past 24 hrs:   Recent Results (from the past 24 hour(s))   XR Toe Left G/E 2 Views    Narrative    EXAM: XR TOE LEFT G/E 2 VIEWS  LOCATION: [facility]  DATE: 9/19/2023    INDICATION: Advanced infection great toe. Eval for osteomyelitis.  COMPARISON: None.      Impression    IMPRESSION: There is soft tissue swelling, subcutaneous gas and thickening of the great toe near the distal phalanx. Soft tissue gas can be seen with direct extension from an area of ulceration as well as from a gas-forming organism/infection.   Correlation with physical exam findings is recommended. Subtle cortical irregularity and fragmentation of the cortex can be seen with trauma or osteomyelitis. If further evaluation is needed, recommend MRI.     NOTE: ABNORMAL REPORT    THE DICTATION ABOVE DESCRIBES AN ABNORMALITY FOR WHICH FOLLOW-UP IS NEEDED.    US Lower Extremity Arterial Duplex Left    Narrative    EXAM: US LOWER EXTREMITY ARTERIAL DUPLEX LEFT  LOCATION: [facility]  DATE: 9/19/2023    INDICATION: left great toe osteo, will need ampuation, hx of severe PAD, previous stenting  COMPARISON: None.  TECHNIQUE: Duplex utilizing 2D gray-scale imaging, Doppler interrogation with color-flow and spectral waveform analysis.    FINDINGS: Significant multifocal atherosclerotic plaque,  calcified and noncalcified, greatest in the SFA.    LEFT LOWER EXTREMITY ARTERIAL ASSESSMENT:  Common femoral artery: 52 cm/s, biphasic  Profunda femoris artery: 53 cm/s, monophasic  SFA (proximal): 26 cm/s, monophasic  SFA (mid): 7 cm/s, monophasic  SFA (distal): 21 cm/s, monophasic  Popliteal artery: 72 cm/s, monophasic  Anterior tibial artery: 16 cm/s, monophasic  Posterior tibial artery: 34 cm/s, monophasic  Dorsalis pedis artery: 49 cm/s, monophasic      Impression    IMPRESSION:  1.  Sonographic findings compatible with high-grade stenosis of the proximal SFA with resultant decreased velocities.   MR Foot Left w/o & w Contrast    Narrative    MR left foot without and with contrast 9/20/2023 8:48 AM    History: has a severe infection of left great toe, errythema extending  up into the dorsum of the foot, evaluate for osteo    Techniques: Multiplanar multisequence imaging of the left foot was  obtained before and after the administration of intravenous contrast.    Comparison: Xray left toe 9/19/2023    Findings:    Bones    Shallow ulcer along the plantar aspect of the of the first distal  phalanx tuft with circumferential underlying soft tissue  thickening/edema and phlegmonous changes. There are several small T2  hyperintense fluid collections along the dorsal aspect of the distal  phalanx, with incomplete rim enhancement, the largest of which  measures 11 x 6 mm (series 9, image 7). Erosive changes of the tuft of  the distal phalanx with corresponding T1 hypointense/T2 hyperintense  marrow signal changes which extend to the base of the distal phalanx.  Normal marrow signal of the first proximal phalanx.     Otherwise normal marrow signal pattern in the remainder of the  visualized osseous structures in the foot. No focal osseous lesion.  Cystic-like change in the dorsal navicular. Degenerative changes of  the first interphalangeal joint.    Joints and periarticular soft tissue    Joint effusion: Physiologic  amount of joint fluid are present.    Plantar plates: Intersesamoidal ligament and sesamoidal phalangeal  ligaments of the first metatarsophalangeal joints are intact. Plantar  plates of the second through fifth toe at metatarsophalangeal joints  are grossly intact.    Intermetatarsal spaces: No interdigital neuroma. Increased fluid in  the first and fourth intermetatarsal spaces.    Ligaments and Tendons    Lisfranc interosseous ligament: Intact.    Tendons: The visualized courses of flexor and extensor tendons are  intact. Mild tenosynovitis associated with the flexor hallucis longus.    Muscles    Confluent intramuscular edema throughout the interosseous muscles  suggestive of underlying microangiopathy/polyneuropathy. No isolated  fatty atrophy.      Impression    Impression:  1. Imaging findings compatible with osteomyelitis involving the entire  first distal phalanx with erosive changes involving the tuft. No  evidence of osteomyelitis involvement of the first proximal phalanx.  2. Circumferential soft tissue edema and phlegmonous changes about the  first distal phalanx with several small fluid collections with  incomplete rim enhancement, suggestive of developing abscess  formation.  3. Mild tenosynovitis of the flexor hallucis longus.    I have personally reviewed the examination and initial interpretation  and I agree with the findings.    PRIMO ROSALES MD         SYSTEM ID:  [id number]

## 2023-09-20 NOTE — PHARMACY-VANCOMYCIN DOSING SERVICE
"Pharmacy Vancomycin Initial Note  Date of Service 2023  Patient's  1960  63 year old, male    Indication: Skin and Soft Tissue Infection    Current estimated CrCl = Estimated Creatinine Clearance: 90.2 mL/min (based on SCr of 0.86 mg/dL).    Creatinine for last 3 days  2023:  5:38 PM Creatinine 0.79 mg/dL  2023:  6:53 AM Creatinine 0.86 mg/dL    Recent Vancomycin Level(s) for last 3 days  No results found for requested labs within last 3 days.      Vancomycin IV Administrations (past 72 hours)                     vancomycin (VANCOCIN) 1,750 mg in 0.9% NaCl 500 mL intermittent infusion (mg) 1,750 mg Given 23                    Nephrotoxins and other renal medications (From now, onward)      Start     Dose/Rate Route Frequency Ordered Stop    23 0900  piperacillin-tazobactam (ZOSYN) 3.375 g vial to attach to  mL bag        Note to Pharmacy: For [facility], [facility] and [facility]: For Zosyn-naive patients, use the \"Zosyn initial dose + extended infusion\" order panel.    3.375 g  over 30 Minutes Intravenous EVERY 6 HOURS 23 0801      23 0800  [Held by provider]  empagliflozin (JARDIANCE) tablet 25 mg        (Held by provider since 2023 at 0824 by Naresh Arredondo DO.Hold Reason: OtherHold Comments: Surgery)   Note to Pharmacy: PTA Sig:Take 25 mg by mouth      25 mg Oral DAILY 23              Contrast Orders - past 72 hours (72h ago, onward)      Start     Dose/Rate Route Frequency Stop    23 0830  gadobutrol (GADAVIST) injection 7 mL         7 mL Intravenous ONCE 23 0813            [employer] Prediction of Planned Initial Vancomycin Regimen  Loading dose: N/A  Regimen: 1000 mg IV every 12 hours.  Start time: 08:37 on 2023  Exposure target: AUC24 (range)400-600 mg/L.hr   AUC24,ss: 550 mg/L.hr  Probability of AUC24 > 400: 81 %  Ctrough,ss: 17.4 mg/L  Probability of Ctrough,ss > 20: 38 %  Probability of nephrotoxicity (Lodise KOKI ): " 13 %          Plan:  Start vancomycin  1000 mg IV q12h.   Vancomycin monitoring method: AUC  Vancomycin therapeutic monitoring goal: 400-600 mg*h/L  Pharmacy will check vancomycin levels as appropriate in 1-3 Days.    Serum creatinine levels will be ordered a minimum of twice weekly.      Adry Herrera RPH

## 2023-09-20 NOTE — PLAN OF CARE
Pt arrived to room 450, all belongings intact with patient. Alert and oriented x4, BP elevated, prn hydralazine given. Pain managed with tylenol. PIV infusing NS @ 100 ml/hr, with intermittent antibiotics. BG checks, pt refuses insulin, independent. Left great toe swollen, pus filled, warm to touch. Pt is angry about being admitted in the hospital. Writer was able to encourage pt to spend the night and see hospitalist and podiatrist in the morning. NPO since midnight. Nursing will continue to monitor.

## 2023-09-20 NOTE — H&P
[facility]    History and Physical - Hospitalist Service       Date of Admission:  9/19/2023  Primary Care Physician   [facility]  CONSULTANTS: Podiatry    Assessment & Plan      Jae Meredith is a 63 year old male admitted on 9/19/2023. He is here with a worsening left great toe infection that appears to be osteomyelitis and will require surgery.         Left great toe osteomyelitis/cellulitis of the left foot   Patient has a severe infection of his left great toe which extends with erythema to the dorsum of his left foot.  He appears to have pus under the skin.  His x-ray is showing gas formation and likely osteomyelitis.  Patient was started on Rocephin and vancomycin in the ER and I will add clindamycin to his regimen.  Podiatry will be consulted.  I will check an MRI of his left foot as well as getting an arterial ultrasound to evaluate his vasculature as he has a severe history of peripheral arterial disease.  White blood cell count was 14.2 on admission we will repeat this in the morning.  Patient's pain will be controlled with Tylenol, Atarax, and oxycodone.  Patient most likely needs the left great toe amputated I concern is his vascular disease and which type of amputation as he can heal from.  He continues on anticoagulation which I have not stopped given his severe arterial disease.    Elevated lactic acid   Patient's lactic acid is elevated at 3.2 and this is in the setting of being on metformin.  His bicarb is actually normal at 22 and he does not have an anion gap which is at 15.  Patient will be given a saline bolus and started on IV fluids and we will recheck at lactic acid make sure it is coming down.  His metformin will be held.  Patient's creatinine is actually normal.    Slight dehydration   Patient's BUN/creatinine ratio was slightly elevated and has a hemoconcentrated hemoglobin.  Patient will be started on IV fluids.    Type II  non-insulin-dependent diabetes with polyneuropathy   Patient's home metformin will be held especially given his elevated lactic acid.  He is on Jardiance and we will start the patient on insulin correction scale.  His last hemoglobin A1c was 6.4 on 6/6/2023 with no needs of repeating this.    Uncontrolled hypertension   The patient's blood pressure is quite elevated in the emergency room.  I will increase the patient's home Norvasc and continue on his Aldactone.  Have hydralazine available as needed as well.    Hypothyroidism   To continue on Synthroid    Hypertriglyceridemia   Patient is on low-dose Crestor.  In the past his triglycerides were as high as 900 but came down to the 400s in 2022.  His LDL at that time was 29.  He has refused to be on fenofibrate    Severe peripheral arterial disease/chronic anticoagulation   Patient is followed at [facility] with Dr. Saba vascular surgery.  Is on chronic Eliquis as well as aspirin for this.  Reviewing vascular notes he has a history of an aorta femoral bypass in 2014, a right ileal profunda femoral graft with kissing stents of the aorta and the femoral arteries 5/12/2022.  We will be checking an arterial ultrasound of his left leg given the likely need for surgery.  We may need to consider transferring him for vascular surgery depending on the findings and I would send him to [facility] where he is known.  Patient continues to smoke as well.    Ongoing tobacco abuse   Patient will be given nicotine patch.  Patient needs to quit smoking but he has no intention of this she has been well documented with his primary and vascular consultants..    Primary aldosteronism   Continues on spironolactone    History of fatty liver    4Ms:  Polypharmacy: Patient's medications were reviewed and appear to be appropriate  Mentation:   Capacity intact to make his own medical decisions  Social support: Lives in a split-level house with stairs with his wife.  They have 1 son.  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  Please excuse any typographical errors that may have occurred.  Please call with any questions.     Clinically Significant Risk Factors Present on Admission               # Drug Induced Coagulation Defect: home medication list includes an anticoagulant medication  # Drug Induced Platelet Defect: home medication list includes an antiplatelet medication        # Overweight: Estimated body mass index is 26.31 kg/m  as calculated from the following:    Height as of this encounter: 1.702 m (5' 7\").    Weight as of this encounter: 76.2 kg (168 lb).              Disposition Plan patient will need a plan for podiatry and likely amputation.  They require transfer to [facility] given his vascular disease.  Expect a prolonged hospital stay.     Expected Discharge Date: 09/21/2023                  Esau France MD  Hospitalist Service  [facility]  Securely message with [employer] (more info)  Text page via [employer] Paging/Directory     Length of stay: Anticipate greater than 2 midnight hospitalization for evaluation and treatment of osteomyelitis of the left great toe          ______________________________________________________________________    Chief Complaint   Infected left great toe    History is obtained from the patient, also reviewed EMR from [facility] and vascular    History of Present Illness   Jae Meredith is a 63 year old male who has a complex medical history including type 2 diabetes not insulin-dependent, polyneuropathy " related to diabetes, hypertension, hypothyroidism, hyper triglyceridemia, severe peripheral arterial disease with previous vascular surgery with bypass as well as stenting, and ongoing tobacco abuse.  Patient presents with a left great toe severe infection.  This started about 8 days ago [facility] was started on doxycycline he thinks he got better for a little while but unfortunately progressed and is gotten worse.  He does have pain associated with it that is 3/10 despite having neuropathy.  Denies any fever, chills, or sweats.  Does not appear to be septic.  Does have an elevated white blood cell count.  Was started on Rocephin as well as vancomycin in the emergency room.  Had a chest x-ray done showing likely osteomyelitis with gas formation.  Patient will be coming into the hospital for ongoing cares.  Patient's white blood cell count was 14.2, hemoglobin 17.3, creatinine is normal but his BUN is over 18.  Patient also has a lactic acid of 3.2.  His left great toe is malodorous.      Past Medical History    No past medical history on file.    Past Surgical History   Past Surgical History:   Procedure Laterality Date    IR LOWER EXTREMITY ANGIOGRAM RIGHT  5/11/2022       Prior to Admission Medications   Prior to Admission Medications   Prescriptions Last Dose Informant Patient Reported? Taking?   amLODIPine (NORVASC) 5 MG tablet   Yes Yes   Sig: Take 5 mg by mouth daily   apixaban ANTICOAGULANT (ELIQUIS) 5 MG tablet   Yes Yes   Sig: Take 5 mg by mouth   aspirin (ASA) 325 MG tablet   Yes No   Sig: Take 325 mg by mouth   empagliflozin (JARDIANCE) 25 MG TABS tablet   Yes Yes   Sig: Take 25 mg by mouth   gabapentin (NEURONTIN) 600 MG tablet   Yes Yes   Sig: Take 600mg in the morning, 1200mg before bed prn. Indications: Diabetes with Nerve Disease   levothyroxine (SYNTHROID/LEVOTHROID) 137 MCG tablet   Yes Yes   Sig: Take 137mcg daily and add 1/2 tab once per week.   metFORMIN (GLUCOPHAGE) 500 MG  tablet   Yes Yes   Sig: Take 1,000 mg by mouth   rosuvastatin (CRESTOR) 5 MG tablet   Yes Yes   Sig: Take 5 mg by mouth   spironolactone (ALDACTONE) 50 MG tablet   Yes Yes   Sig: Take 50 mg by mouth      Facility-Administered Medications: None        Allergies   Allergies   Allergen Reactions    Adenosine Anaphylaxis and Other (See Comments)     PN: Became unresponsive, unable to speak    Dobutamine Other (See Comments)     Fainting    Atorvastatin Muscle Pain (Myalgia)    Ibuprofen Other (See Comments)     tinnitus        REVIEW OF SYSTEMS:  A comprehensive review of systems was negative except for items noted in the HPI.  Patient currently denies any fever, chills, sweats, nausea, vomiting, diarrhea, shortness of breath, or chest pain.      Physical Exam   Vital Signs: Temp: 97.8  F (36.6  C) Temp src: Oral BP: (!) 196/103 Pulse: 72   Resp: 16 SpO2: 98 % O2 Device: None (Room air)    Weight: 168 lbs 0 oz    General appearance: Patient is alert and oriented x3, no apparent distress, pleasant and conversing normally, speaking in full sentences, appears stated age, not happy that he is having to come into the hospital appears older than stated age  HEENT:  Atraumatic/normal cephalic, EOMI, Pupils equally round and reactive to light, sclera non-icteric, Mucous membranes are moist  NECK:  supple without bruit or lymphadenopathy  RESPIRATORY: Clear to auscultation bilateral, good air movement  CARDIOVASCULAR: Regular rate and rhythm, normal S1/S2, no murmurs, rubs, or gallops present  GASTROINTESTINAL: Non-distended, non-tender, soft, bowel sounds present throughout, no mass or hepatosplenomegaly  MUSCULOSKELETAL: without deformity, normal range of motion  SKIN:  Warm, dry, no rashes, no mottling  NEUROLOGIC:  Cranial nerves II-XII intact, without any focal deficits, strength 5/5 throughout  EXTREMITIES:  Moves all extremities, no clubbing, cyanosis, nor edema, left great toe looks horrible with pus under the skin, it  is tender to touch, he has erythema moving to the dorsum of his left foot  :  Jair not present         Data     I have personally reviewed the following data over the past 24 hrs:    14.2 (H)  \   17.3   / 233     133 (L) 96 (L) 18.5 /  219 (H)   4.4 22 0.79 \     Procal: N/A CRP: N/A Lactic Acid: 3.2 (H)         Imaging:   Results for orders placed or performed during the hospital encounter of 09/19/23   XR Toe Left G/E 2 Views    Narrative    EXAM: XR TOE LEFT G/E 2 VIEWS  LOCATION: [facility]  DATE: 9/19/2023    INDICATION: Advanced infection great toe. Eval for osteomyelitis.  COMPARISON: None.      Impression    IMPRESSION: There is soft tissue swelling, subcutaneous gas and thickening of the great toe near the distal phalanx. Soft tissue gas can be seen with direct extension from an area of ulceration as well as from a gas-forming organism/infection.   Correlation with physical exam findings is recommended. Subtle cortical irregularity and fragmentation of the cortex can be seen with trauma or osteomyelitis. If further evaluation is needed, recommend MRI.     NOTE: ABNORMAL REPORT    THE DICTATION ABOVE DESCRIBES AN ABNORMALITY FOR WHICH FOLLOW-UP IS NEEDED.      Procedures: Doppler arterial ultrasound of the left leg as well as an MRI of his left foot/toes are pending    I have personally have reviewed the patient's most up to date radiologic exams, labs, orders, and medications myself

## 2023-09-20 NOTE — ANESTHESIA PREPROCEDURE EVALUATION
Anesthesia Pre-Procedure Evaluation    Patient: Jae Meredith   MRN: [id number] : 1960        Procedure : Procedure(s):  Left hallux amputation          No past medical history on file.   Past Surgical History:   Procedure Laterality Date    IR LOWER EXTREMITY ANGIOGRAM RIGHT  2022      Allergies   Allergen Reactions    Adenosine Anaphylaxis and Other (See Comments)     PN: Became unresponsive, unable to speak    Dobutamine Other (See Comments)     Fainting    Atorvastatin Muscle Pain (Myalgia)    Ibuprofen Other (See Comments)     tinnitus      Social History     Tobacco Use    Smoking status: Every Day     Types: Cigarettes    Smokeless tobacco: Not on file   Substance Use Topics    Alcohol use: Not on file      Wt Readings from Last 1 Encounters:   23 73.5 kg (162 lb 0.6 oz)        Anesthesia Evaluation   Pt has had prior anesthetic. Type: General.    No history of anesthetic complications       ROS/MED HX  ENT/Pulmonary:  - neg pulmonary ROS   (+)                tobacco use, Current use,                      Neurologic:     (+)    peripheral neuropathy,                            Cardiovascular:  - neg cardiovascular ROS   (+)  - Peripheral Vascular Disease-- Symptomatic.   -  - -                                      METS/Exercise Tolerance:     Hematologic:  - neg hematologic  ROS     Musculoskeletal:  - neg musculoskeletal ROS     GI/Hepatic:  - neg GI/hepatic ROS     Renal/Genitourinary:  - neg Renal ROS     Endo:     (+)  type II DM,       Diabetic complications: neuropathy.      Obesity,       Psychiatric/Substance Use:  - neg psychiatric ROS     Infectious Disease:       Malignancy:       Other:            Physical Exam    Airway        Mallampati: II   TM distance: > 3 FB   Neck ROM: full   Mouth opening: > 3 cm    Respiratory Devices and Support         Dental       (+) Modest Abnormalities - crowns, retainers, 1 or 2 missing teeth      Cardiovascular   cardiovascular exam normal           Pulmonary   pulmonary exam normal                OUTSIDE LABS:  CBC:   Lab Results   Component Value Date    WBC 15.1 (H) 09/20/2023    WBC 14.0 (H) 09/20/2023    HGB 14.5 09/20/2023    HGB 15.4 09/20/2023    HCT 43.2 09/20/2023    HCT 44.6 09/20/2023     09/20/2023     09/20/2023     BMP:   Lab Results   Component Value Date     09/20/2023     (L) 09/19/2023    POTASSIUM 4.9 09/20/2023    POTASSIUM 4.4 09/19/2023    CHLORIDE 105 09/20/2023    CHLORIDE 96 (L) 09/19/2023    CO2 25 09/20/2023    CO2 22 09/19/2023    BUN 12.3 09/20/2023    BUN 18.5 09/19/2023    CR 0.86 09/20/2023    CR 0.79 09/19/2023     (H) 09/20/2023     (H) 09/20/2023     COAGS: No results found for: PTT, INR, FIBR  POC: No results found for: BGM, HCG, HCGS  HEPATIC: No results found for: ALBUMIN, PROTTOTAL, ALT, AST, GGT, ALKPHOS, BILITOTAL, BILIDIRECT, ANDREW  OTHER:   Lab Results   Component Value Date    LACT 2.3 (H) 09/20/2023    ROJAS 9.0 09/20/2023       Anesthesia Plan    ASA Status:  4       Anesthesia Type: MAC.     - Reason for MAC: straight local not clinically adequate              Consents    Anesthesia Plan(s) and associated risks, benefits, and realistic alternatives discussed. Questions answered and patient/representative(s) expressed understanding.     - Discussed:     - Discussed with:  Patient      - Extended Intubation/Ventilatory Support Discussed: No.      - Patient is DNR/DNI Status: No     Use of blood products discussed: No .     Postoperative Care    Pain management: IV analgesics, Oral pain medications, Multi-modal analgesia.   PONV prophylaxis: Dexamethasone or Solumedrol, Ondansetron (or other 5HT-3)     Comments:                Sridhar Erazo MD

## 2023-09-21 ENCOUNTER — ANESTHESIA EVENT (OUTPATIENT)
Dept: SURGERY | Facility: CLINIC | Age: 63
End: 2023-09-21
Payer: COMMERCIAL

## 2023-09-21 ENCOUNTER — ANESTHESIA (OUTPATIENT)
Dept: SURGERY | Facility: CLINIC | Age: 63
End: 2023-09-21
Payer: COMMERCIAL

## 2023-09-21 LAB
ANION GAP SERPL CALCULATED.3IONS-SCNC: 10 MMOL/L (ref 7–15)
APTT PPP: 31 SECONDS (ref 22–38)
APTT PPP: 55 SECONDS (ref 22–38)
APTT PPP: 57 SECONDS (ref 22–38)
BUN SERPL-MCNC: 10.5 MG/DL (ref 8–23)
CALCIUM SERPL-MCNC: 8.7 MG/DL (ref 8.8–10.2)
CHLORIDE SERPL-SCNC: 102 MMOL/L (ref 98–107)
CREAT SERPL-MCNC: 0.88 MG/DL (ref 0.67–1.17)
CRP SERPL-MCNC: 10.58 MG/L
DEPRECATED HCO3 PLAS-SCNC: 23 MMOL/L (ref 22–29)
EGFRCR SERPLBLD CKD-EPI 2021: >90 ML/MIN/1.73M2
ERYTHROCYTE [DISTWIDTH] IN BLOOD BY AUTOMATED COUNT: 12.4 % (ref 10–15)
GLUCOSE BLDC GLUCOMTR-MCNC: 104 MG/DL (ref 70–99)
GLUCOSE BLDC GLUCOMTR-MCNC: 119 MG/DL (ref 70–99)
GLUCOSE BLDC GLUCOMTR-MCNC: 121 MG/DL (ref 70–99)
GLUCOSE BLDC GLUCOMTR-MCNC: 161 MG/DL (ref 70–99)
GLUCOSE BLDC GLUCOMTR-MCNC: 164 MG/DL (ref 70–99)
GLUCOSE BLDC GLUCOMTR-MCNC: 173 MG/DL (ref 70–99)
GLUCOSE SERPL-MCNC: 187 MG/DL (ref 70–99)
HCT VFR BLD AUTO: 46.7 % (ref 40–53)
HGB BLD-MCNC: 16.4 G/DL (ref 13.3–17.7)
MCH RBC QN AUTO: 32.9 PG (ref 26.5–33)
MCHC RBC AUTO-ENTMCNC: 35.1 G/DL (ref 31.5–36.5)
MCV RBC AUTO: 94 FL (ref 78–100)
PLATELET # BLD AUTO: 198 10E3/UL (ref 150–450)
POTASSIUM SERPL-SCNC: 4.5 MMOL/L (ref 3.4–5.3)
RBC # BLD AUTO: 4.98 10E6/UL (ref 4.4–5.9)
SODIUM SERPL-SCNC: 135 MMOL/L (ref 136–145)
WBC # BLD AUTO: 11.1 10E3/UL (ref 4–11)

## 2023-09-21 PROCEDURE — 272N000001 HC OR GENERAL SUPPLY STERILE: Performed by: ORTHOPAEDIC SURGERY

## 2023-09-21 PROCEDURE — 250N000013 HC RX MED GY IP 250 OP 250 PS 637: Performed by: INTERNAL MEDICINE

## 2023-09-21 PROCEDURE — 88311 DECALCIFY TISSUE: CPT | Mod: 26 | Performed by: PATHOLOGY

## 2023-09-21 PROCEDURE — 36415 COLL VENOUS BLD VENIPUNCTURE: CPT | Performed by: HOSPITALIST

## 2023-09-21 PROCEDURE — 85730 THROMBOPLASTIN TIME PARTIAL: CPT | Performed by: HOSPITALIST

## 2023-09-21 PROCEDURE — 120N000001 HC R&B MED SURG/OB

## 2023-09-21 PROCEDURE — 88311 DECALCIFY TISSUE: CPT | Mod: TC | Performed by: ORTHOPAEDIC SURGERY

## 2023-09-21 PROCEDURE — 0Y6Q0Z0 DETACHMENT AT LEFT 1ST TOE, COMPLETE, OPEN APPROACH: ICD-10-PCS | Performed by: ORTHOPAEDIC SURGERY

## 2023-09-21 PROCEDURE — 250N000011 HC RX IP 250 OP 636: Performed by: PHYSICIAN ASSISTANT

## 2023-09-21 PROCEDURE — 88305 TISSUE EXAM BY PATHOLOGIST: CPT | Mod: TC | Performed by: ORTHOPAEDIC SURGERY

## 2023-09-21 PROCEDURE — 250N000011 HC RX IP 250 OP 636: Performed by: NURSE ANESTHETIST, CERTIFIED REGISTERED

## 2023-09-21 PROCEDURE — 86140 C-REACTIVE PROTEIN: CPT | Performed by: PHYSICIAN ASSISTANT

## 2023-09-21 PROCEDURE — 250N000009 HC RX 250: Performed by: NURSE ANESTHETIST, CERTIFIED REGISTERED

## 2023-09-21 PROCEDURE — 710N000012 HC RECOVERY PHASE 2, PER MINUTE: Performed by: ORTHOPAEDIC SURGERY

## 2023-09-21 PROCEDURE — 85730 THROMBOPLASTIN TIME PARTIAL: CPT | Performed by: INTERNAL MEDICINE

## 2023-09-21 PROCEDURE — 370N000017 HC ANESTHESIA TECHNICAL FEE, PER MIN: Performed by: ORTHOPAEDIC SURGERY

## 2023-09-21 PROCEDURE — 88305 TISSUE EXAM BY PATHOLOGIST: CPT | Mod: 26 | Performed by: PATHOLOGY

## 2023-09-21 PROCEDURE — 82310 ASSAY OF CALCIUM: CPT | Performed by: HOSPITALIST

## 2023-09-21 PROCEDURE — 250N000011 HC RX IP 250 OP 636: Mod: JZ | Performed by: HOSPITALIST

## 2023-09-21 PROCEDURE — 360N000075 HC SURGERY LEVEL 2, PER MIN: Performed by: ORTHOPAEDIC SURGERY

## 2023-09-21 PROCEDURE — 85027 COMPLETE CBC AUTOMATED: CPT | Performed by: HOSPITALIST

## 2023-09-21 PROCEDURE — 250N000011 HC RX IP 250 OP 636: Performed by: ORTHOPAEDIC SURGERY

## 2023-09-21 PROCEDURE — 999N000141 HC STATISTIC PRE-PROCEDURE NURSING ASSESSMENT: Performed by: ORTHOPAEDIC SURGERY

## 2023-09-21 PROCEDURE — 258N000003 HC RX IP 258 OP 636: Performed by: NURSE ANESTHETIST, CERTIFIED REGISTERED

## 2023-09-21 PROCEDURE — 99233 SBSQ HOSP IP/OBS HIGH 50: CPT | Performed by: HOSPITALIST

## 2023-09-21 RX ORDER — HYDROMORPHONE HCL IN WATER/PF 6 MG/30 ML
0.2 PATIENT CONTROLLED ANALGESIA SYRINGE INTRAVENOUS EVERY 5 MIN PRN
Status: DISCONTINUED | OUTPATIENT
Start: 2023-09-21 | End: 2023-09-21 | Stop reason: HOSPADM

## 2023-09-21 RX ORDER — ONDANSETRON 2 MG/ML
4 INJECTION INTRAMUSCULAR; INTRAVENOUS EVERY 30 MIN PRN
Status: DISCONTINUED | OUTPATIENT
Start: 2023-09-21 | End: 2023-09-21 | Stop reason: HOSPADM

## 2023-09-21 RX ORDER — HYDROCODONE BITARTRATE AND ACETAMINOPHEN 5; 325 MG/1; MG/1
1-2 TABLET ORAL EVERY 6 HOURS PRN
Status: DISCONTINUED | OUTPATIENT
Start: 2023-09-21 | End: 2023-09-21 | Stop reason: ALTCHOICE

## 2023-09-21 RX ORDER — ONDANSETRON 2 MG/ML
INJECTION INTRAMUSCULAR; INTRAVENOUS PRN
Status: DISCONTINUED | OUTPATIENT
Start: 2023-09-21 | End: 2023-09-21

## 2023-09-21 RX ORDER — ONDANSETRON 4 MG/1
4 TABLET, ORALLY DISINTEGRATING ORAL EVERY 30 MIN PRN
Status: DISCONTINUED | OUTPATIENT
Start: 2023-09-21 | End: 2023-09-21 | Stop reason: HOSPADM

## 2023-09-21 RX ORDER — FENTANYL CITRATE 50 UG/ML
50 INJECTION, SOLUTION INTRAMUSCULAR; INTRAVENOUS EVERY 5 MIN PRN
Status: DISCONTINUED | OUTPATIENT
Start: 2023-09-21 | End: 2023-09-21 | Stop reason: HOSPADM

## 2023-09-21 RX ORDER — HYDROMORPHONE HCL IN WATER/PF 6 MG/30 ML
0.4 PATIENT CONTROLLED ANALGESIA SYRINGE INTRAVENOUS EVERY 5 MIN PRN
Status: DISCONTINUED | OUTPATIENT
Start: 2023-09-21 | End: 2023-09-21 | Stop reason: HOSPADM

## 2023-09-21 RX ORDER — BISACODYL 10 MG
10 SUPPOSITORY, RECTAL RECTAL DAILY PRN
Status: DISCONTINUED | OUTPATIENT
Start: 2023-09-21 | End: 2023-09-22 | Stop reason: HOSPADM

## 2023-09-21 RX ORDER — AMOXICILLIN 250 MG
1 CAPSULE ORAL 2 TIMES DAILY
Status: DISCONTINUED | OUTPATIENT
Start: 2023-09-21 | End: 2023-09-22 | Stop reason: HOSPADM

## 2023-09-21 RX ORDER — OXYCODONE HYDROCHLORIDE 5 MG/1
5 TABLET ORAL
Status: DISCONTINUED | OUTPATIENT
Start: 2023-09-21 | End: 2023-09-21 | Stop reason: HOSPADM

## 2023-09-21 RX ORDER — OXYCODONE HYDROCHLORIDE 10 MG/1
10 TABLET ORAL EVERY 4 HOURS PRN
Status: DISCONTINUED | OUTPATIENT
Start: 2023-09-21 | End: 2023-09-21 | Stop reason: ALTCHOICE

## 2023-09-21 RX ORDER — ONDANSETRON 4 MG/1
4 TABLET, ORALLY DISINTEGRATING ORAL EVERY 30 MIN PRN
Status: DISCONTINUED | OUTPATIENT
Start: 2023-09-21 | End: 2023-09-21

## 2023-09-21 RX ORDER — HYDROCODONE BITARTRATE AND ACETAMINOPHEN 5; 325 MG/1; MG/1
1-2 TABLET ORAL EVERY 6 HOURS PRN
Status: DISCONTINUED | OUTPATIENT
Start: 2023-09-21 | End: 2023-09-22 | Stop reason: HOSPADM

## 2023-09-21 RX ORDER — GLYCOPYRROLATE 0.2 MG/ML
INJECTION, SOLUTION INTRAMUSCULAR; INTRAVENOUS PRN
Status: DISCONTINUED | OUTPATIENT
Start: 2023-09-21 | End: 2023-09-21

## 2023-09-21 RX ORDER — LIDOCAINE HYDROCHLORIDE 20 MG/ML
INJECTION, SOLUTION INFILTRATION; PERINEURAL PRN
Status: DISCONTINUED | OUTPATIENT
Start: 2023-09-21 | End: 2023-09-21

## 2023-09-21 RX ORDER — GABAPENTIN 100 MG/1
100 CAPSULE ORAL 3 TIMES DAILY PRN
Status: DISCONTINUED | OUTPATIENT
Start: 2023-09-21 | End: 2023-09-22 | Stop reason: HOSPADM

## 2023-09-21 RX ORDER — HYDROMORPHONE HCL IN WATER/PF 6 MG/30 ML
0.4 PATIENT CONTROLLED ANALGESIA SYRINGE INTRAVENOUS
Status: DISCONTINUED | OUTPATIENT
Start: 2023-09-21 | End: 2023-09-22 | Stop reason: HOSPADM

## 2023-09-21 RX ORDER — ONDANSETRON 2 MG/ML
4 INJECTION INTRAMUSCULAR; INTRAVENOUS EVERY 30 MIN PRN
Status: DISCONTINUED | OUTPATIENT
Start: 2023-09-21 | End: 2023-09-21

## 2023-09-21 RX ORDER — HYDROMORPHONE HCL IN WATER/PF 6 MG/30 ML
0.2 PATIENT CONTROLLED ANALGESIA SYRINGE INTRAVENOUS
Status: DISCONTINUED | OUTPATIENT
Start: 2023-09-21 | End: 2023-09-22 | Stop reason: HOSPADM

## 2023-09-21 RX ORDER — PROCHLORPERAZINE MALEATE 10 MG
10 TABLET ORAL EVERY 6 HOURS PRN
Status: DISCONTINUED | OUTPATIENT
Start: 2023-09-21 | End: 2023-09-22 | Stop reason: HOSPADM

## 2023-09-21 RX ORDER — FENTANYL CITRATE 50 UG/ML
INJECTION, SOLUTION INTRAMUSCULAR; INTRAVENOUS PRN
Status: DISCONTINUED | OUTPATIENT
Start: 2023-09-21 | End: 2023-09-21

## 2023-09-21 RX ORDER — OXYCODONE HYDROCHLORIDE 5 MG/1
5 TABLET ORAL EVERY 4 HOURS PRN
Status: DISCONTINUED | OUTPATIENT
Start: 2023-09-21 | End: 2023-09-21 | Stop reason: ALTCHOICE

## 2023-09-21 RX ORDER — BUPIVACAINE HYDROCHLORIDE 5 MG/ML
INJECTION, SOLUTION PERINEURAL PRN
Status: DISCONTINUED | OUTPATIENT
Start: 2023-09-21 | End: 2023-09-21 | Stop reason: HOSPADM

## 2023-09-21 RX ORDER — LIDOCAINE 40 MG/G
CREAM TOPICAL
Status: DISCONTINUED | OUTPATIENT
Start: 2023-09-21 | End: 2023-09-22

## 2023-09-21 RX ORDER — SODIUM CHLORIDE, SODIUM LACTATE, POTASSIUM CHLORIDE, CALCIUM CHLORIDE 600; 310; 30; 20 MG/100ML; MG/100ML; MG/100ML; MG/100ML
INJECTION, SOLUTION INTRAVENOUS CONTINUOUS PRN
Status: DISCONTINUED | OUTPATIENT
Start: 2023-09-21 | End: 2023-09-21

## 2023-09-21 RX ORDER — METOPROLOL TARTRATE 1 MG/ML
1-2 INJECTION, SOLUTION INTRAVENOUS EVERY 5 MIN PRN
Status: DISCONTINUED | OUTPATIENT
Start: 2023-09-21 | End: 2023-09-21 | Stop reason: HOSPADM

## 2023-09-21 RX ORDER — OXYCODONE HYDROCHLORIDE 10 MG/1
10 TABLET ORAL
Status: DISCONTINUED | OUTPATIENT
Start: 2023-09-21 | End: 2023-09-21

## 2023-09-21 RX ORDER — ACETAMINOPHEN 325 MG/1
650 TABLET ORAL EVERY 4 HOURS PRN
Status: DISCONTINUED | OUTPATIENT
Start: 2023-09-24 | End: 2023-09-22 | Stop reason: HOSPADM

## 2023-09-21 RX ORDER — ONDANSETRON 2 MG/ML
4 INJECTION INTRAMUSCULAR; INTRAVENOUS EVERY 6 HOURS PRN
Status: DISCONTINUED | OUTPATIENT
Start: 2023-09-21 | End: 2023-09-22 | Stop reason: HOSPADM

## 2023-09-21 RX ORDER — PROPOFOL 10 MG/ML
INJECTION, EMULSION INTRAVENOUS CONTINUOUS PRN
Status: DISCONTINUED | OUTPATIENT
Start: 2023-09-21 | End: 2023-09-21

## 2023-09-21 RX ORDER — SODIUM CHLORIDE, SODIUM LACTATE, POTASSIUM CHLORIDE, CALCIUM CHLORIDE 600; 310; 30; 20 MG/100ML; MG/100ML; MG/100ML; MG/100ML
INJECTION, SOLUTION INTRAVENOUS CONTINUOUS
Status: DISCONTINUED | OUTPATIENT
Start: 2023-09-21 | End: 2023-09-21 | Stop reason: HOSPADM

## 2023-09-21 RX ORDER — CEFAZOLIN SODIUM 1 G/3ML
1 INJECTION, POWDER, FOR SOLUTION INTRAMUSCULAR; INTRAVENOUS EVERY 8 HOURS
Status: COMPLETED | OUTPATIENT
Start: 2023-09-21 | End: 2023-09-22

## 2023-09-21 RX ORDER — OXYCODONE HYDROCHLORIDE 5 MG/1
10 TABLET ORAL
Status: DISCONTINUED | OUTPATIENT
Start: 2023-09-21 | End: 2023-09-21 | Stop reason: HOSPADM

## 2023-09-21 RX ORDER — PROPOFOL 10 MG/ML
INJECTION, EMULSION INTRAVENOUS PRN
Status: DISCONTINUED | OUTPATIENT
Start: 2023-09-21 | End: 2023-09-21

## 2023-09-21 RX ORDER — FENTANYL CITRATE 50 UG/ML
25 INJECTION, SOLUTION INTRAMUSCULAR; INTRAVENOUS EVERY 5 MIN PRN
Status: DISCONTINUED | OUTPATIENT
Start: 2023-09-21 | End: 2023-09-21 | Stop reason: HOSPADM

## 2023-09-21 RX ORDER — CALCIUM CARBONATE 500 MG/1
500 TABLET, CHEWABLE ORAL 4 TIMES DAILY PRN
Status: DISCONTINUED | OUTPATIENT
Start: 2023-09-21 | End: 2023-09-22 | Stop reason: HOSPADM

## 2023-09-21 RX ORDER — POLYETHYLENE GLYCOL 3350 17 G/17G
17 POWDER, FOR SOLUTION ORAL DAILY
Status: DISCONTINUED | OUTPATIENT
Start: 2023-09-22 | End: 2023-09-22 | Stop reason: HOSPADM

## 2023-09-21 RX ORDER — LIDOCAINE 40 MG/G
CREAM TOPICAL
Status: DISCONTINUED | OUTPATIENT
Start: 2023-09-21 | End: 2023-09-21 | Stop reason: HOSPADM

## 2023-09-21 RX ORDER — OXYCODONE HYDROCHLORIDE 5 MG/1
5 TABLET ORAL
Status: DISCONTINUED | OUTPATIENT
Start: 2023-09-21 | End: 2023-09-21

## 2023-09-21 RX ORDER — ACETAMINOPHEN 325 MG/1
975 TABLET ORAL EVERY 8 HOURS
Status: DISCONTINUED | OUTPATIENT
Start: 2023-09-21 | End: 2023-09-21 | Stop reason: ALTCHOICE

## 2023-09-21 RX ORDER — ONDANSETRON 4 MG/1
4 TABLET, ORALLY DISINTEGRATING ORAL EVERY 6 HOURS PRN
Status: DISCONTINUED | OUTPATIENT
Start: 2023-09-21 | End: 2023-09-22 | Stop reason: HOSPADM

## 2023-09-21 RX ADMIN — MIDAZOLAM 2 MG: 1 INJECTION INTRAMUSCULAR; INTRAVENOUS at 18:55

## 2023-09-21 RX ADMIN — PIPERACILLIN AND TAZOBACTAM 3.38 G: 3; .375 INJECTION, POWDER, FOR SOLUTION INTRAVENOUS at 10:08

## 2023-09-21 RX ADMIN — LEVOTHYROXINE SODIUM 137 MCG: 137 TABLET ORAL at 08:14

## 2023-09-21 RX ADMIN — ONDANSETRON 4 MG: 2 INJECTION INTRAMUSCULAR; INTRAVENOUS at 19:00

## 2023-09-21 RX ADMIN — PIPERACILLIN AND TAZOBACTAM 3.38 G: 3; .375 INJECTION, POWDER, FOR SOLUTION INTRAVENOUS at 15:58

## 2023-09-21 RX ADMIN — VANCOMYCIN HYDROCHLORIDE 1000 MG: 1 INJECTION, SOLUTION INTRAVENOUS at 10:56

## 2023-09-21 RX ADMIN — NICOTINE 1 PATCH: 14 PATCH, EXTENDED RELEASE TRANSDERMAL at 08:08

## 2023-09-21 RX ADMIN — SODIUM CHLORIDE, POTASSIUM CHLORIDE, SODIUM LACTATE AND CALCIUM CHLORIDE: 600; 310; 30; 20 INJECTION, SOLUTION INTRAVENOUS at 18:30

## 2023-09-21 RX ADMIN — GABAPENTIN 1200 MG: 600 TABLET, FILM COATED ORAL at 21:38

## 2023-09-21 RX ADMIN — PIPERACILLIN AND TAZOBACTAM 3.38 G: 3; .375 INJECTION, POWDER, FOR SOLUTION INTRAVENOUS at 04:14

## 2023-09-21 RX ADMIN — HEPARIN SODIUM 1200 UNITS/HR: 10000 INJECTION, SOLUTION INTRAVENOUS at 08:02

## 2023-09-21 RX ADMIN — HYDROCODONE BITARTRATE AND ACETAMINOPHEN 1 TABLET: 5; 325 TABLET ORAL at 15:56

## 2023-09-21 RX ADMIN — CEFAZOLIN 1 G: 1 INJECTION, POWDER, FOR SOLUTION INTRAMUSCULAR; INTRAVENOUS at 21:32

## 2023-09-21 RX ADMIN — LIDOCAINE HYDROCHLORIDE 30 MG: 20 INJECTION, SOLUTION INFILTRATION; PERINEURAL at 18:59

## 2023-09-21 RX ADMIN — PROPOFOL 30 MG: 10 INJECTION, EMULSION INTRAVENOUS at 19:07

## 2023-09-21 RX ADMIN — GLYCOPYRROLATE 0.2 MG: 0.2 INJECTION, SOLUTION INTRAMUSCULAR; INTRAVENOUS at 19:00

## 2023-09-21 RX ADMIN — VANCOMYCIN HYDROCHLORIDE 1000 MG: 1 INJECTION, SOLUTION INTRAVENOUS at 21:39

## 2023-09-21 RX ADMIN — HYDROCODONE BITARTRATE AND ACETAMINOPHEN 1 TABLET: 5; 325 TABLET ORAL at 08:14

## 2023-09-21 RX ADMIN — PROPOFOL 100 MCG/KG/MIN: 10 INJECTION, EMULSION INTRAVENOUS at 18:57

## 2023-09-21 RX ADMIN — FENTANYL CITRATE 50 MCG: 50 INJECTION, SOLUTION INTRAMUSCULAR; INTRAVENOUS at 18:59

## 2023-09-21 RX ADMIN — PIPERACILLIN AND TAZOBACTAM 3.38 G: 3; .375 INJECTION, POWDER, FOR SOLUTION INTRAVENOUS at 21:02

## 2023-09-21 RX ADMIN — AMLODIPINE BESYLATE 10 MG: 10 TABLET ORAL at 08:08

## 2023-09-21 RX ADMIN — ROSUVASTATIN CALCIUM 5 MG: 5 TABLET, FILM COATED ORAL at 08:08

## 2023-09-21 ASSESSMENT — LIFESTYLE VARIABLES: TOBACCO_USE: 1

## 2023-09-21 ASSESSMENT — ACTIVITIES OF DAILY LIVING (ADL)
ADLS_ACUITY_SCORE: 23
ADLS_ACUITY_SCORE: 23
ADLS_ACUITY_SCORE: 20
ADLS_ACUITY_SCORE: 23
ADLS_ACUITY_SCORE: 23
ADLS_ACUITY_SCORE: 20

## 2023-09-21 NOTE — PLAN OF CARE
Orientation:A&ox4  Vss afebrile  02: ra96%  LS:clear  GI:bs+. Npo for surgery  : voiding with out problems  Skin: left great toe scabbed, pink/red/edematous. Serosang drainage. 2x2 drsg pt changes periodically   Activity: up indep in room  Pain: c/o pain. Vicodin given with good relief.   Plan:iv abx, pt npo for surgery case at 1800. Heparin gtt stopped pre-op. Bgs 164, 161, pt declined insulin coverage.

## 2023-09-21 NOTE — ANESTHESIA PREPROCEDURE EVALUATION
Anesthesia Pre-Procedure Evaluation    Patient: Jae Meredith   MRN: [id number] : 1960        Procedure : Procedure(s):  AMPUTATION, TOE          No past medical history on file.   Past Surgical History:   Procedure Laterality Date    IR LOWER EXTREMITY ANGIOGRAM RIGHT  2022      Allergies   Allergen Reactions    Adenosine Anaphylaxis and Other (See Comments)     PN: Became unresponsive, unable to speak    Dobutamine Other (See Comments)     Fainting    Atorvastatin Muscle Pain (Myalgia)    Ibuprofen Other (See Comments)     tinnitus      Social History     Tobacco Use    Smoking status: Every Day     Types: Cigarettes    Smokeless tobacco: Not on file   Substance Use Topics    Alcohol use: Not on file      Wt Readings from Last 1 Encounters:   23 73.5 kg (162 lb 0.6 oz)        Anesthesia Evaluation            ROS/MED HX  ENT/Pulmonary:     (+)                tobacco use, Current use,                      Neurologic:  - neg neurologic ROS     Cardiovascular:     (+)  hypertension (uncontrolled)- Peripheral Vascular Disease-- Carotid Stenosis.   -  - -                                      METS/Exercise Tolerance: >4 METS    Hematologic:  - neg hematologic  ROS     Musculoskeletal:  - neg musculoskeletal ROS     GI/Hepatic:  - neg GI/hepatic ROS     Renal/Genitourinary:  - neg Renal ROS     Endo:     (+)  type II DM,                    Psychiatric/Substance Use:  - neg psychiatric ROS     Infectious Disease: Comment: osteo      Malignancy:  - neg malignancy ROS     Other:  - neg other ROS          Physical Exam    Airway        Mallampati: II    Neck ROM: full     Respiratory Devices and Support         Dental       (+) Modest Abnormalities - crowns, retainers, 1 or 2 missing teeth      Cardiovascular   cardiovascular exam normal       Rhythm and rate: regular     Pulmonary   pulmonary exam normal                OUTSIDE LABS:  CBC:   Lab Results   Component Value Date    WBC 11.1 (H) 2023     WBC 15.1 (H) 09/20/2023    HGB 16.4 09/21/2023    HGB 14.5 09/20/2023    HCT 46.7 09/21/2023    HCT 43.2 09/20/2023     09/21/2023     09/20/2023     BMP:   Lab Results   Component Value Date     (L) 09/21/2023     09/20/2023    POTASSIUM 4.5 09/21/2023    POTASSIUM 4.9 09/20/2023    CHLORIDE 102 09/21/2023    CHLORIDE 105 09/20/2023    CO2 23 09/21/2023    CO2 25 09/20/2023    BUN 10.5 09/21/2023    BUN 12.3 09/20/2023    CR 0.88 09/21/2023    CR 0.86 09/20/2023     (H) 09/21/2023     (H) 09/21/2023     COAGS:   Lab Results   Component Value Date    PTT 55 (H) 09/21/2023     POC: No results found for: BGM, HCG, HCGS  HEPATIC: No results found for: ALBUMIN, PROTTOTAL, ALT, AST, GGT, ALKPHOS, BILITOTAL, BILIDIRECT, ANDREW  OTHER:   Lab Results   Component Value Date    LACT 2.3 (H) 09/20/2023    ROJAS 8.7 (L) 09/21/2023       Anesthesia Plan    ASA Status:  3    NPO Status:  NPO Appropriate    Anesthesia Type: MAC.     - Reason for MAC: straight local not clinically adequate, immobility needed   Induction: Intravenous, Propofol.   Maintenance: TIVA.        Consents    Anesthesia Plan(s) and associated risks, benefits, and realistic alternatives discussed. Questions answered and patient/representative(s) expressed understanding.     - Discussed:     - Discussed with:  Patient            Postoperative Care    Pain management: IV analgesics, Oral pain medications, Multi-modal analgesia.   PONV prophylaxis: Ondansetron (or other 5HT-3), Dexamethasone or Solumedrol     Comments:                Mau Yost DO

## 2023-09-21 NOTE — CONSULTS
Chief complaint: Left great toe infection and necrosis    History patient is a pleasant 63-year-old male with diabetes, polyneuropathy, tobacco habituation, and peripheral arterial disease with hypertriglyceridemia.  He describes a 1 to 2-week course of progressive pain drainage and discoloration of the toe.  MRI showed osteomyelitis of the phalanx.  He has a nonhealing wound with necrosis and gangrene.  Podiatry had evaluated him and recommended amputation but the patient had a disagreement with the service and wished orthopedic evaluation.    Past medical history is as above.  Please see the full record.    Physical examination: examination shows necrosis.  The remaining foot appears vascular.  There is a demarcation in the region of the MTP joint.    Impression: Gangrenous left great toe with osteomyelitis and peripheral vascular disease with smoking habituation    Recommendation: I agree with the podiatry recommendation.  I reviewed that he is at high risk for surgery and I recommend amputation.  I reviewed the risk of nonhealing wound and persistent infection with future amputation at a more proximal level.  Vascular evaluation would be helpful but at this point he has an active infection with necrosis and amputation is indicated.  Risks benefits complications postoperative course are reviewed and surgery be scheduled today.  The patient and and his family are in agreement with this.

## 2023-09-21 NOTE — CONSULTS
"CLINICAL NUTRITION SERVICES  -  ASSESSMENT NOTE      Recommendations:   - Diet per MD.     Malnutrition Diagnosis: Unable to evaluate d/t insufficient information          REASON FOR ASSESSMENT  Jae Meredith is a 63 year old male seen by Registered Dietitian for Malnutrition Screening Tool (MST).     PMH of: DMII, PAD, fatty liver.    Admit 2/2: L toe osteo/cellulitis of foot.     NUTRITION HISTORY  - Information obtained from chart from a staff safety standpoint as patient documented to be \"verbally aggressive\", refusing BG checks, medications at times, \"agitated with staff\", security escorted patient back to room yesterday.  Per review of chart, patient is refusing surgical intervention w/ podiatry team (refer to notes).    - Allergies: NKFA.      CURRENT NUTRITION ORDERS  Diet Order:     Mod CHO    Current Intake/Tolerance:  Limited timeframe of admission and patient w/ 100% intakes thus far based on available flowsheet review.        ANTHROPOMETRICS  Height: 5' 7\"  Weight: 162 lbs .61 oz  Body mass index is 25.38 kg/m .  Weight Status:  Overweight BMI 25-29.9  Weight History:  Wt Readings from Last 10 Encounters:   09/19/23 73.5 kg (162 lb 0.6 oz)     - Wt of 167# from 8/17/2023.  Possible 3% wt loss w/ unclear timeframe.      LABS: Reviewed:  No results found for: A1C    MEDICATIONS: Reviewed.    GI: Stooling patterns noted.     SKIN: No current documentation of PI.       ASSESSED NUTRITION NEEDS PER APPROVED PRACTICE GUIDELINES:    Dosing Weight 74 kg   Estimated Energy Needs: 25-30 Kcal/Kg  Justification: maintenance  Estimated Protein Needs: 1-1.2 g pro/Kg  Justification: preservation of lean body mass  Estimated Fluid Needs: per MD      NUTRITION DIAGNOSIS:  Predicted inadequate nutrient intake (energy/protein) related to potential for decline in PO intakes pending appetite and LOS.    NUTRITION INTERVENTIONS  Recommendations / Nutrition Prescription  See above.    Implementation  Nutrition education: " Per Provider order if indicated.       Nutrition goals:  PO intakes of at least 75% meal consumption while acutely admitted.    MONITORING AND EVALUATION:  Progress towards goals will be monitored and evaluated per protocol and Practice Guidelines          Sara Will RDN, LD  Clinical Dietitian  3rd floor/ICU: [phone]  All other floors: [phone]  Weekend/holiday: [phone]  Office: [phone]

## 2023-09-21 NOTE — ED PROVIDER NOTES
History     Chief Complaint:  Wound Check       HPI     Jae Meredith is a 63 year old male presents with left great toe pain.  Patient had the gradual onset of left great toe pain and swelling.  He was seen at [facility] and was placed on doxycycline but symptoms have continued to worsen with progressively worsening swelling and erythema prompting ED visit..  He believes that he has neuropathy resulting in decreased sensation which is limited his pain.  He denies fever, vomiting or additional symptoms.      Independent Historian:    None    Review of External Notes:  None     Medications:    Amlodipine  Eliquis  Jardiance  Gabapentin  Levothyroxine  Metformin  Rosuvastatin  Spironolactone   Tadalafil      Past Medical History:    Diabetes mellitus  Hyperlipidemia  Hypertension  Neuropathy    Past Surgical History:    Past Surgical History:   Procedure Laterality Date    IR LOWER EXTREMITY ANGIOGRAM RIGHT  5/11/2022          Physical Exam   Patient Vitals for the past 24 hrs:   BP Temp Temp src Pulse Resp SpO2   09/20/23 1855 (!) 174/87 99.4  F (37.4  C) Temporal 69 20 97 %   09/20/23 0907 122/64 -- -- 57 -- --   09/20/23 0733 (!) 153/87 97.8  F (36.6  C) Oral 58 18 97 %   09/20/23 0622 (!) 176/92 -- -- 58 -- --        Physical Exam      HEENT:    Oropharynx is moist  Eyes:    Conjunctiva normal  Neck:     Supple, no meningismus.     CV:     Regular rate and rhythm.      No murmurs, rubs or gallops.       No unilateral leg swelling.       2+ radial pulses bilateral.    PULM:    Clear to auscultation bilateral.       No respiratory distress.      Good air exchange.     No rales or wheezing.     No stridor.  ABD:    Soft, non-tender, non-distended.       No pulsatile masses.       No rebound, guarding or rigidity.  MSK:     Left great toe:       Marked edema, erythema and superficial ulcer to the plantar surface of the toe      No crepitus or rapidly expanding erythema      Erythema extends to the dorsum of  the foot overlying the first metatarsal  LYMPH:   No cervical lymphadenopathy.  NEURO:   Alert. Good muscle tone, no atrophy.  Skin:    Warm, dry  Psych:    Mood is good and affect is appropriate.      Emergency Department Course     Imaging:  MR Foot Left w/o & w Contrast   Final Result   Impression:   1. Imaging findings compatible with osteomyelitis involving the entire   first distal phalanx with erosive changes involving the tuft. No   evidence of osteomyelitis involvement of the first proximal phalanx.   2. Circumferential soft tissue edema and phlegmonous changes about the   first distal phalanx with several small fluid collections with   incomplete rim enhancement, suggestive of developing abscess   formation.   3. Mild tenosynovitis of the flexor hallucis longus.      I have personally reviewed the examination and initial interpretation   and I agree with the findings.      PRIMO ROSALES MD            SYSTEM ID:  [id number]      US Lower Extremity Arterial Duplex Left   Final Result   IMPRESSION:   1.  Sonographic findings compatible with high-grade stenosis of the proximal SFA with resultant decreased velocities.      XR Toe Left G/E 2 Views   Final Result   IMPRESSION: There is soft tissue swelling, subcutaneous gas and thickening of the great toe near the distal phalanx. Soft tissue gas can be seen with direct extension from an area of ulceration as well as from a gas-forming organism/infection.    Correlation with physical exam findings is recommended. Subtle cortical irregularity and fragmentation of the cortex can be seen with trauma or osteomyelitis. If further evaluation is needed, recommend MRI.       NOTE: ABNORMAL REPORT      THE DICTATION ABOVE DESCRIBES AN ABNORMALITY FOR WHICH FOLLOW-UP IS NEEDED.       US CLARISSE Doppler No Exercise    (Results Pending)     Report per radiology    Laboratory:  Labs Ordered and Resulted from Time of ED Arrival to Time of ED Departure   BASIC METABOLIC PANEL -  Abnormal       Result Value    Sodium 133 (*)     Potassium 4.4      Chloride 96 (*)     Carbon Dioxide (CO2) 22      Anion Gap 15      Urea Nitrogen 18.5      Creatinine 0.79      Calcium 9.8      Glucose 219 (*)     GFR Estimate >90     CBC WITH PLATELETS AND DIFFERENTIAL - Abnormal    WBC Count 14.2 (*)     RBC Count 5.24      Hemoglobin 17.3      Hematocrit 48.4      MCV 92      MCH 33.0      MCHC 35.7      RDW 12.2      Platelet Count 233      % Neutrophils 71      % Lymphocytes 18      % Monocytes 6      % Eosinophils 2      % Basophils 1      % Immature Granulocytes 2      NRBCs per 100 WBC 0      Absolute Neutrophils 10.1 (*)     Absolute Lymphocytes 2.6      Absolute Monocytes 0.9      Absolute Eosinophils 0.2      Absolute Basophils 0.1      Absolute Immature Granulocytes 0.3      Absolute NRBCs 0.0     BLOOD GAS VENOUS WITH OXYHEMOGLOBIN - Abnormal    pH Venous 7.43      pCO2 Venous 39 (*)     pO2 Venous 39      Bicarbonate Venous 25      FIO2 0      Oxyhemoglobin Venous 72      Base Excess/Deficit 1.1     LACTIC ACID WHOLE BLOOD - Abnormal    Lactic Acid 3.2 (*)    GLUCOSE MONITOR NURSING POCT   GLUCOSE MONITOR NURSING POCT          Emergency Department Course & Assessments:      Interventions:  Medications   nicotine Patch in Place ( Transdermal Patch in Place 9/20/23 2051)   nicotine (NICODERM CQ) 14 MG/24HR 24 hr patch 1 patch ( Transdermal Unhold 9/20/23 1402)   amLODIPine (NORVASC) tablet 10 mg ( Oral Unhold 9/20/23 1402)   empagliflozin (JARDIANCE) tablet 25 mg ( Oral Automatically Held 9/26/23 0800)   gabapentin (NEURONTIN) tablet 1,200 mg (1,200 mg Oral $Given 9/20/23 2106)   levothyroxine (SYNTHROID/LEVOTHROID) tablet 137 mcg ( Oral Unhold 9/20/23 1402)   rosuvastatin (CRESTOR) tablet 5 mg ( Oral Unhold 9/20/23 1402)   spironolactone (ALDACTONE) tablet 50 mg ( Oral Automatically Held 9/25/23 2100)   lidocaine 1 % 0.1-1 mL ( Other Unhold 9/20/23 1402)   lidocaine (LMX4) cream ( Topical Unhold  9/20/23 1402)   sodium chloride (PF) 0.9% PF flush 3 mL ( Intracatheter Canceled Entry 9/20/23 2149)   sodium chloride (PF) 0.9% PF flush 3 mL ( Intracatheter Unhold 9/20/23 1402)   glucose gel 15-30 g ( Oral Unhold 9/20/23 1402)     Or   dextrose 50 % injection 25-50 mL ( Intravenous Unhold 9/20/23 1402)     Or   glucagon injection 1 mg ( Subcutaneous Unhold 9/20/23 1402)   Patient is already receiving anticoagulation with heparin, enoxaparin (LOVENOX), warfarin (COUMADIN)  or other anticoagulant medication (has no administration in time range)   hydrOXYzine (ATARAX) tablet 25 mg (25 mg Oral $Given 9/20/23 1932)     Or   hydrOXYzine (ATARAX) tablet 50 mg ( Oral See Alternative 9/20/23 1932)   senna-docusate (SENOKOT-S/PERICOLACE) 8.6-50 MG per tablet 1 tablet ( Oral Unhold 9/20/23 1402)     Or   senna-docusate (SENOKOT-S/PERICOLACE) 8.6-50 MG per tablet 2 tablet ( Oral Unhold 9/20/23 1402)   polyethylene glycol (MIRALAX) Packet 17 g ( Oral Unhold 9/20/23 1402)   ondansetron (ZOFRAN ODT) ODT tab 4 mg ( Oral Unhold 9/20/23 1402)     Or   ondansetron (ZOFRAN) injection 4 mg ( Intravenous Unhold 9/20/23 1402)   insulin aspart (NovoLOG) injection (RAPID ACTING) ( Subcutaneous Not Given 9/20/23 2149)   hydrALAZINE (APRESOLINE) tablet 25 mg ( Oral Unhold 9/20/23 1402)   levothyroxine (SYNTHROID/LEVOTHROID) tablet 200 mcg ( Oral Unhold 9/20/23 1402)   HYDROcodone-acetaminophen (NORCO) 5-325 MG per tablet 1-2 tablet (1 tablet Oral $Given 9/20/23 1617)   naloxone (NARCAN) injection 0.2 mg ( Intravenous Unhold 9/20/23 1402)     Or   naloxone (NARCAN) injection 0.4 mg ( Intravenous Unhold 9/20/23 1402)     Or   naloxone (NARCAN) injection 0.2 mg ( Intramuscular Unhold 9/20/23 1402)     Or   naloxone (NARCAN) injection 0.4 mg ( Intramuscular Unhold 9/20/23 1402)   heparin 25,000 units in 0.45% NaCl 250 mL ANTICOAGULANT infusion (1,200 Units/hr Intravenous Rate/Dose Change 9/20/23 7642)   piperacillin-tazobactam (ZOSYN) 3.375 g  vial to attach to  mL bag (3.375 g Intravenous $New Bag 23)   vancomycin (VANCOCIN) 1,000 mg in 200 mL dextrose intermittent infusion (1,000 mg Intravenous $New Bag 23)   cefTRIAXone (ROCEPHIN) 2 g vial to attach to  ml bag for ADULTS or NS 50 ml bag for PEDS (0 g Intravenous Stopped 23)   sodium chloride 0.9% BOLUS 1,000 mL (1,000 mLs Intravenous $New Bag 23)   vancomycin (VANCOCIN) 1,750 mg in 0.9% NaCl 500 mL intermittent infusion (1,750 mg Intravenous $Given 23)   sodium chloride 0.9% BOLUS 1,000 mL (1,000 mLs Intravenous $New Bag 23)   gadobutrol (GADAVIST) injection 7 mL (7 mLs Intravenous $Given 23)   heparin - BOLUS DOSE from infusion (4,400 Units Intravenous $Given 23)       Independent Interpretation (X-rays, CTs, rhythm strip):  I independent reviewed x-ray of the left great toe in which there is soft tissue gas but no fracture    Consultations/Discussion of Management or Tests:  Hospital medicine service       Social Determinants of Health affecting care:  None     Disposition:  The patient was admitted to the hospital under the care of Dr. France.     Impression & Plan        Medical Decision Makin-year-old male with diabetes and neuropathy presents with progressively worsening left great toe pain and swelling despite doxycycline as an outpatient.  Evaluation consistent with osteomyelitis clinically.  X-ray reveals soft tissue gas likely associated to ulcer and not gas-forming organisms.  There are equivocal findings on x-ray for osteomyelitis.  Patient given broad-spectrum antibiotics, cultures pending and will likely need MRI to evaluate definitively for osteomyelitis.  Patient transferred to medical bed.    Diagnosis:    ICD-10-CM    1. Osteomyelitis of great toe of left foot (H)  M86.9 CANCELED: Case Request: Left hallux amputation     CANCELED: Case Request: Left hallux amputation            Discharge Medications:  Current Discharge Medication List             Leroy Banerjee MD  9/21/2023              Leroy Banerjee MD  09/21/23 0257

## 2023-09-21 NOTE — PROGRESS NOTES
[facility]    Medicine Progress Note - Hospitalist Service    Date of Admission:  9/19/2023    Assessment & Plan   Jae Meredith is a 63 year old male w/PMH DMT2, polyneuropathy related to diabetes, hypertension, hypothyroidism, hyper triglyceridemia, severe peripheral arterial disease s/p multiple stents        Left great toe osteomyelitis/cellulitis/abscess   sepsis  -Patient has a severe infection of his left great toe which extends with erythema to the dorsum of his left foot and x-ray is showing gas formation and likely osteomyelitis.  -mild leukocytosis, mild but variable lactic acid but vitals stable  -MRI showing osteo entire first distal phalanx and soft tissue findings c/w developing abscess first distal phalanx  -initially on Vanc/Rocephin then given Clinda but will change to IV Vanc and Zosyn and await blood cx  -podiatry consulted but patient became belligerent and refused surgery. Orthopedic surgery is consulted for amputation, await plan  -has been accepted by his vascular surgery team at [facility] on 9/20 but awaiting bed, recommend amputation here in interim if patient/ortho agreeable  -changed eliquis to heparin drip in anticipation of surgery     Medical non compliance  Aggression with staff  -has been agitated and verbally aggressive with staff, has also been recording staff without consent  -refusing labs and vitals at times and going outside to smoke against hospital policy  -discussed again expectations of behavior while admitted, any attempt to leave hospital will be considered an AMA    Severe peripheral arterial disease/chronic anticoagulation  High grade stenosis Proximal SFA on L  -Patient is followed at [facility] with Dr. Saba vascular surgery.  Is on chronic Eliquis as well as aspirin for this.    -Reviewing vascular notes he has a history of an aorta femoral bypass in 2014, a right ileal profunda femoral graft with kissing stents of the aorta and  the femoral arteries 5/12/2022.    -US with stenosis as noted above, discussed with vascular surgery team and CLARISSE's have been ordered per their request  -holding home DOAC and placed on heparin drip, cont ASA     DMT2 with polyneuropathy  -holding home metformin/jardiance with expected dye studies, surgery  -cont ISS     Uncontrolled hypertension  -improved, cont norvasc. Holding home aldactone     Hypothyroidism  -To continue on Synthroid     Hypertriglyceridemia  -cont statin, has refused fenofibrate in past      Ongoing tobacco abuse  -Patient will be given nicotine patch.  Patient needs to quit smoking but he has no intention of this she has been well documented with his primary and vascular consultants..     Primary aldosteronism  -hold spironolactone to avoid WES with nephrotoxic meds     History of fatty liver     Diet: Consistent Carbohydrate Diet Moderate Consistent Carb (60 g CHO per Meal) Diet    DVT Prophylaxis: heparin drip  Adam Catheter: Not present  Lines: None     Cardiac Monitoring: None  Code Status: Full Code        Disposition Plan   Await transfer to [facility]            Naresh Arredondo DO  Hospitalist Service  [facility]  Securely message with [employer] (more info)  Text page via [employer] Paging/Directory   ______________________________________________________________________    Interval History   Agitated overnight, leaving floor to smoke against hospital policy. This am very confrontational and argumentative despite multiple discussions with him. Asking same questions repeatedly despite over 30 minutes spent in discussion. Audio recording interactions without consent    Physical Exam   Vital Signs: Temp: 99.5  F (37.5  C) Temp src: Temporal BP: (!) 144/82 Pulse: 70   Resp: 16 SpO2: 96 % O2 Device: None (Room air)    Weight: 162 lbs .61 oz  Constitutional: awake, alert, and agitated   Eyes: pupils equal, round and reactive to light and conjunctiva normal  ENT:  normocepalic, without obvious abnormality, atramatic  Respiratory: no increased work of breathing, good air exchange, and no retractions  Cardiovascular: regular rate and rhythm and no murmur noted  GI: normal bowel sounds, soft, and non-distended  Skin: L big toe with erythema and swelling noted, pain to palpation and warmth of toe and into foot but no overt streaking present  Neurologic: alert, no focal deficits noted, moving all exremeties    50 MINUTES SPENT BY ME on the date of service doing chart review, history, exam, documentation & further activities per the note.      Data   ------------------------- PAST 24 HR DATA REVIEWED -----------------------------------------------    I have personally reviewed the following data over the past 24 hrs:    11.1 (H)  \   16.4   / 198     135 (L) 102 10.5 /  161 (H)   4.5 23 0.88 \     Procal: N/A CRP: 10.58 (H) Lactic Acid: N/A       INR:  N/A PTT:  55 (H)   D-dimer:  N/A Fibrinogen:  N/A       Imaging results reviewed over the past 24 hrs:   Recent Results (from the past 24 hour(s))   US CLARISSE Doppler No Exercise    Narrative    US CLARISSE DOPPLER NO EXERCISE, 1-2 LEVELS, BILAT  9/20/2023 4:12 PM    CLINICAL HISTORY: L SFA obstruction    COMPARISON: None.    CLARISSE FINDINGS:     RIGHT(mmHg)  Brachial: 160  Ankle (PT): 110; Index 0.64  Ankle (DP): 107; Index 0.62    LEFT (mmHg)  Brachial: 172  Ankle (PT): 105; Index 0.61  Ankle (DP): 111; Index 0.65      Impression    IMPRESSION:  1.  RIGHT LOWER EXTREMITY: CLARISSE at rest is low consistent with moderate  peripheral arterial disease. Biphasic waveforms within the posterior  tibial and dorsal pedis artery.    2.  LEFT LOWER EXTREMITY: CLARISSE at rest is low consistent with moderate  peripheral arterial disease. Monophasic waveforms within the posterior  tibial and dorsal pedis artery concerning for inflow stenosis.    CLARISSE CRITERIA:  >1.4 NC  0.95-1.4 Normal  0.90 - 0.94 Mild  0.5 - 0.89 Moderate  0.2 - 0.49 Severe  <0.2  Critical    PRIMO MOSQUEDA MD         SYSTEM ID:  [id number]

## 2023-09-21 NOTE — PLAN OF CARE
Goal Outcome Evaluation:  .Patient vital signs are at baseline: Yes  Patient able to ambulate as they were prior to admission or with assist devices provided by therapies during their stay:  Yes  Patient MUST void prior to discharge:  Yes  Patient able to tolerate oral intake:  No,  Reason:  YES  Pain has adequate pain control using Oral analgesics:  Yes  Does patient have an identified :  Yes  Has goal D/C date and time been discussed with patient:  No,  Reason:  to be determined  Pt is alert and oriented x 4, agreed to blood sugar check at 0200 but refused 1 unit of insulin.Zosyn given for antibiotic. Heparin running at 1200 units/hr recheck done this morning.

## 2023-09-21 NOTE — CONSULTS
[facility]    Orthopedic Consultation    Jae Meredith MRN# [id number]   Age: 63 year old YOB: 1960     Date of Admission:  9/19/2023    Reason for consult: Osteomyelitis left great toe       Requesting provider: Dr Arredondo       Level of consult: Consult, follow and place orders           Assessment and Plan:   Assessment:   Left hallux gas gangrene and osteomyelitis   Peripheral Arterial Disease   Diabetes Mellitus      Plan:   Per discussion with the hospitalist, patient is not medically able to discharge to have the amputation performed in an outpatient setting.    Surgery for a great toe amputation will be scheduled for later tonight  Surgeon: Dr Daniel  NPO effective now   WBAT left LE  Continue IV ABx           Chief Complaint:   Great toe infection          History of Present Illness:   Jae Meredith is a 63 year old male  with a past medical history significant for what's listed below. He presented with worsening left foot infection and was found to have gas gangrene on xray. His wife states that the issue [place].   - Patient has a severe infection of his left great toe which extends with erythema to the dorsum of his left foot and x-ray is showing gas formation and likely osteomyelitis.  - Mild leukocytosis  - MRI showing osteo entire first distal phalanx and soft tissue findings c/w developing abscess first distal phalanx.           Past Medical History:   No past medical history on file.          Past Surgical History:     Past Surgical History:   Procedure Laterality Date    IR LOWER EXTREMITY ANGIOGRAM RIGHT  5/11/2022             Social History:     Social History     Tobacco Use    Smoking status: Every Day     Types: Cigarettes    Smokeless tobacco: Not on file   Substance Use Topics    Alcohol use: Not on file             Family History:   History reviewed. No pertinent family history.          Immunizations:      VACCINE/DOSE   Diptheria   DPT   DTAP   HBIG   Hepatitis A   Hepatitis B   HIB   Influenza   Measles   Meningococcal   MMR   Mumps   Pneumococcal   Polio   Rubella   Small Pox   TDAP   Varicella   Zoster             Allergies:     Allergies   Allergen Reactions    Adenosine Anaphylaxis and Other (See Comments)     PN: Became unresponsive, unable to speak    Dobutamine Other (See Comments)     Fainting    Atorvastatin Muscle Pain (Myalgia)    Ibuprofen Other (See Comments)     tinnitus             Medications:     Current Facility-Administered Medications   Medication    amLODIPine (NORVASC) tablet 10 mg    glucose gel 15-30 g    Or    dextrose 50 % injection 25-50 mL    Or    glucagon injection 1 mg    [Held by provider] empagliflozin (JARDIANCE) tablet 25 mg    gabapentin (NEURONTIN) tablet 1,200 mg    heparin 25,000 units in 0.45% NaCl 250 mL ANTICOAGULANT infusion    hydrALAZINE (APRESOLINE) tablet 25 mg    HYDROcodone-acetaminophen (NORCO) 5-325 MG per tablet 1-2 tablet    hydrOXYzine (ATARAX) tablet 25 mg    Or    hydrOXYzine (ATARAX) tablet 50 mg    insulin aspart (NovoLOG) injection (RAPID ACTING)    levothyroxine (SYNTHROID/LEVOTHROID) tablet 137 mcg    levothyroxine (SYNTHROID/LEVOTHROID) tablet 200 mcg    lidocaine (LMX4) cream    lidocaine 1 % 0.1-1 mL    naloxone (NARCAN) injection 0.2 mg    Or    naloxone (NARCAN) injection 0.4 mg    Or    naloxone (NARCAN) injection 0.2 mg    Or    naloxone (NARCAN) injection 0.4 mg    nicotine (NICODERM CQ) 14 MG/24HR 24 hr patch 1 patch    nicotine Patch in Place    ondansetron (ZOFRAN ODT) ODT tab 4 mg    Or    ondansetron (ZOFRAN) injection 4 mg    Patient is already receiving anticoagulation with heparin, enoxaparin (LOVENOX), warfarin (COUMADIN)  or other anticoagulant medication    piperacillin-tazobactam (ZOSYN) 3.375 g vial to attach to  mL bag    polyethylene glycol (MIRALAX) Packet 17 g    rosuvastatin (CRESTOR) tablet 5 mg    senna-docusate  (SENOKOT-S/PERICOLACE) 8.6-50 MG per tablet 1 tablet    Or    senna-docusate (SENOKOT-S/PERICOLACE) 8.6-50 MG per tablet 2 tablet    sodium chloride (PF) 0.9% PF flush 3 mL    sodium chloride (PF) 0.9% PF flush 3 mL    [Held by provider] spironolactone (ALDACTONE) tablet 50 mg    vancomycin (VANCOCIN) 1,000 mg in 200 mL dextrose intermittent infusion             Review of Systems:   ROS:  10 point ROS neg other than the symptoms noted above in the HPI.            Physical Exam:   All vitals have been reviewed  Patient Vitals for the past 24 hrs:   BP Temp Temp src Pulse Resp SpO2   09/21/23 0920 -- -- -- -- 16 --   09/21/23 0741 (!) 144/82 99.5  F (37.5  C) Temporal 70 18 96 %   09/21/23 0316 136/68 98.3  F (36.8  C) Temporal 66 18 97 %   09/20/23 1855 (!) 174/87 99.4  F (37.4  C) Temporal 69 20 97 %       Intake/Output Summary (Last 24 hours) at 9/21/2023 1136  Last data filed at 9/20/2023 1858  Gross per 24 hour   Intake 240 ml   Output --   Net 240 ml         Physical Exam   Temp: 99.5  F (37.5  C) Temp src: Temporal BP: (!) 144/82 Pulse: 70   Resp: 16 SpO2: 96 % O2 Device: None (Room air)    Vital Signs with Ranges  Temp:  [98.3  F (36.8  C)-99.5  F (37.5  C)] 99.5  F (37.5  C)  Pulse:  [66-70] 70  Resp:  [16-20] 16  BP: (136-174)/(68-87) 144/82  SpO2:  [96 %-97 %] 96 %  162 lbs .61 oz    Constitutional: Pleasant, alert, appropriate, following commands.  HEENT: Head atraumatic normocephalic. Pupils equal round and reactive to light.  Respiratory: Unlabored breathing no audible wheeze  Cardiovascular: Regular rate and rhythm per pulses  GI: Abdomen non-distended.  Lymph/Hematologic: No lymphadenopathy in areas examined  Genitourinary: No seth  Skin: No rashes, no cyanosis, no edema.  Musculoskeletal: Left hallux: necrotic ulcer to lateral distal end with purulence. Fluctuance noted to distal digit. Cellulitis to base of digit. Plantar hallux ulcer also noted. Digit grossly malodorous.  Lower extremity sensation  is diminished, bilateral foot, to light touch.   Neurologic: normal without focal findings, mental status, speech normal, alert and oriented x iii  Neuropsychiatric: stable             Data:   All laboratory data reviewed  Results for orders placed or performed during the hospital encounter of 09/19/23   XR Toe Left G/E 2 Views     Status: None    Narrative    EXAM: XR TOE LEFT G/E 2 VIEWS  LOCATION: [facility]  DATE: 9/19/2023    INDICATION: Advanced infection great toe. Eval for osteomyelitis.  COMPARISON: None.      Impression    IMPRESSION: There is soft tissue swelling, subcutaneous gas and thickening of the great toe near the distal phalanx. Soft tissue gas can be seen with direct extension from an area of ulceration as well as from a gas-forming organism/infection.   Correlation with physical exam findings is recommended. Subtle cortical irregularity and fragmentation of the cortex can be seen with trauma or osteomyelitis. If further evaluation is needed, recommend MRI.     NOTE: ABNORMAL REPORT    THE DICTATION ABOVE DESCRIBES AN ABNORMALITY FOR WHICH FOLLOW-UP IS NEEDED.    US Lower Extremity Arterial Duplex Left     Status: None    Narrative    EXAM: US LOWER EXTREMITY ARTERIAL DUPLEX LEFT  LOCATION: [facility]  DATE: 9/19/2023    INDICATION: left great toe osteo, will need ampuation, hx of severe PAD, previous stenting  COMPARISON: None.  TECHNIQUE: Duplex utilizing 2D gray-scale imaging, Doppler interrogation with color-flow and spectral waveform analysis.    FINDINGS: Significant multifocal atherosclerotic plaque, calcified and noncalcified, greatest in the SFA.    LEFT LOWER EXTREMITY ARTERIAL ASSESSMENT:  Common femoral artery: 52 cm/s, biphasic  Profunda femoris artery: 53 cm/s, monophasic  SFA (proximal): 26 cm/s, monophasic  SFA (mid): 7 cm/s, monophasic  SFA (distal): 21 cm/s, monophasic  Popliteal artery: 72 cm/s, monophasic  Anterior tibial artery:  16 cm/s, monophasic  Posterior tibial artery: 34 cm/s, monophasic  Dorsalis pedis artery: 49 cm/s, monophasic      Impression    IMPRESSION:  1.  Sonographic findings compatible with high-grade stenosis of the proximal SFA with resultant decreased velocities.   MR Foot Left w/o & w Contrast     Status: None    Narrative    MR left foot without and with contrast 9/20/2023 8:48 AM    History: has a severe infection of left great toe, errythema extending  up into the dorsum of the foot, evaluate for osteo    Techniques: Multiplanar multisequence imaging of the left foot was  obtained before and after the administration of intravenous contrast.    Comparison: Xray left toe 9/19/2023    Findings:    Bones    Shallow ulcer along the plantar aspect of the of the first distal  phalanx tuft with circumferential underlying soft tissue  thickening/edema and phlegmonous changes. There are several small T2  hyperintense fluid collections along the dorsal aspect of the distal  phalanx, with incomplete rim enhancement, the largest of which  measures 11 x 6 mm (series 9, image 7). Erosive changes of the tuft of  the distal phalanx with corresponding T1 hypointense/T2 hyperintense  marrow signal changes which extend to the base of the distal phalanx.  Normal marrow signal of the first proximal phalanx.     Otherwise normal marrow signal pattern in the remainder of the  visualized osseous structures in the foot. No focal osseous lesion.  Cystic-like change in the dorsal navicular. Degenerative changes of  the first interphalangeal joint.    Joints and periarticular soft tissue    Joint effusion: Physiologic amount of joint fluid are present.    Plantar plates: Intersesamoidal ligament and sesamoidal phalangeal  ligaments of the first metatarsophalangeal joints are intact. Plantar  plates of the second through fifth toe at metatarsophalangeal joints  are grossly intact.    Intermetatarsal spaces: No interdigital neuroma. Increased  fluid in  the first and fourth intermetatarsal spaces.    Ligaments and Tendons    Lisfranc interosseous ligament: Intact.    Tendons: The visualized courses of flexor and extensor tendons are  intact. Mild tenosynovitis associated with the flexor hallucis longus.    Muscles    Confluent intramuscular edema throughout the interosseous muscles  suggestive of underlying microangiopathy/polyneuropathy. No isolated  fatty atrophy.      Impression    Impression:  1. Imaging findings compatible with osteomyelitis involving the entire  first distal phalanx with erosive changes involving the tuft. No  evidence of osteomyelitis involvement of the first proximal phalanx.  2. Circumferential soft tissue edema and phlegmonous changes about the  first distal phalanx with several small fluid collections with  incomplete rim enhancement, suggestive of developing abscess  formation.  3. Mild tenosynovitis of the flexor hallucis longus.    I have personally reviewed the examination and initial interpretation  and I agree with the findings.    PRIMO ROSALES MD         SYSTEM ID:  [id number]   US CLARISSE Doppler No Exercise     Status: None    Narrative    US CLARISSE DOPPLER NO EXERCISE, 1-2 LEVELS, BILAT  9/20/2023 4:12 PM    CLINICAL HISTORY: L SFA obstruction    COMPARISON: None.    CLARISSE FINDINGS:     RIGHT(mmHg)  Brachial: 160  Ankle (PT): 110; Index 0.64  Ankle (DP): 107; Index 0.62    LEFT (mmHg)  Brachial: 172  Ankle (PT): 105; Index 0.61  Ankle (DP): 111; Index 0.65      Impression    IMPRESSION:  1.  RIGHT LOWER EXTREMITY: CLARISSE at rest is low consistent with moderate  peripheral arterial disease. Biphasic waveforms within the posterior  tibial and dorsal pedis artery.    2.  LEFT LOWER EXTREMITY: CLARISSE at rest is low consistent with moderate  peripheral arterial disease. Monophasic waveforms within the posterior  tibial and dorsal pedis artery concerning for inflow stenosis.    CLARISSE CRITERIA:  >1.4 NC  0.95-1.4 Normal  0.90 - 0.94  Mild  0.5 - 0.89 Moderate  0.2 - 0.49 Severe  <0.2 Critical    PRIMO MOSQUEDA MD         SYSTEM ID:  [id number]   [place] Draw     Status: None    Narrative    The following orders were created for panel order [place] Draw.  Procedure                               Abnormality         Status                     ---------                               -----------         ------                     Extra Blue Top Tube[[id number]]                              Final result               Extra Red Top Tube[[id number]]                               Final result               Extra Green Top (Lithium...[[id number]]                                                 Extra Purple Top Tube[[id number]]                                                         Please view results for these tests on the individual orders.   Basic metabolic panel (BMP)     Status: Abnormal   Result Value Ref Range    Sodium 133 (L) 136 - 145 mmol/L    Potassium 4.4 3.4 - 5.3 mmol/L    Chloride 96 (L) 98 - 107 mmol/L    Carbon Dioxide (CO2) 22 22 - 29 mmol/L    Anion Gap 15 7 - 15 mmol/L    Urea Nitrogen 18.5 8.0 - 23.0 mg/dL    Creatinine 0.79 0.67 - 1.17 mg/dL    Calcium 9.8 8.8 - 10.2 mg/dL    Glucose 219 (H) 70 - 99 mg/dL    GFR Estimate >90 >60 mL/min/1.73m2   Extra Blue Top Tube     Status: None   Result Value Ref Range    Hold Specimen JIC    Extra Red Top Tube     Status: None   Result Value Ref Range    Hold Specimen JIC    CBC with platelets and differential     Status: Abnormal   Result Value Ref Range    WBC Count 14.2 (H) 4.0 - 11.0 10e3/uL    RBC Count 5.24 4.40 - 5.90 10e6/uL    Hemoglobin 17.3 13.3 - 17.7 g/dL    Hematocrit 48.4 40.0 - 53.0 %    MCV 92 78 - 100 fL    MCH 33.0 26.5 - 33.0 pg    MCHC 35.7 31.5 - 36.5 g/dL    RDW 12.2 10.0 - 15.0 %    Platelet Count 233 150 - 450 10e3/uL    % Neutrophils 71 %    % Lymphocytes 18 %    % Monocytes 6 %    % Eosinophils 2 %    % Basophils 1 %    % Immature Granulocytes 2 %    NRBCs per 100 WBC 0  <1 /100    Absolute Neutrophils 10.1 (H) 1.6 - 8.3 10e3/uL    Absolute Lymphocytes 2.6 0.8 - 5.3 10e3/uL    Absolute Monocytes 0.9 0.0 - 1.3 10e3/uL    Absolute Eosinophils 0.2 0.0 - 0.7 10e3/uL    Absolute Basophils 0.1 0.0 - 0.2 10e3/uL    Absolute Immature Granulocytes 0.3 <=0.4 10e3/uL    Absolute NRBCs 0.0 10e3/uL   Blood gas venous and oxyhgb     Status: Abnormal   Result Value Ref Range    pH Venous 7.43 7.32 - 7.43    pCO2 Venous 39 (L) 40 - 50 mm Hg    pO2 Venous 39 25 - 47 mm Hg    Bicarbonate Venous 25 21 - 28 mmol/L    FIO2 0     Oxyhemoglobin Venous 72 70 - 75 %    Base Excess/Deficit 1.1 -7.7 - 1.9 mmol/L   Lactic acid whole blood     Status: Abnormal   Result Value Ref Range    Lactic Acid 3.2 (H) 0.7 - 2.0 mmol/L   Lactic acid whole blood     Status: Normal   Result Value Ref Range    Lactic Acid 1.5 0.7 - 2.0 mmol/L   Lactic acid whole blood     Status: Abnormal   Result Value Ref Range    Lactic Acid 2.3 (H) 0.7 - 2.0 mmol/L   Glucose by meter     Status: Abnormal   Result Value Ref Range    GLUCOSE BY METER POCT 129 (H) 70 - 99 mg/dL   Glucose by meter     Status: Abnormal   Result Value Ref Range    GLUCOSE BY METER POCT 205 (H) 70 - 99 mg/dL   CBC with platelets     Status: Abnormal   Result Value Ref Range    WBC Count 14.0 (H) 4.0 - 11.0 10e3/uL    RBC Count 4.71 4.40 - 5.90 10e6/uL    Hemoglobin 15.4 13.3 - 17.7 g/dL    Hematocrit 44.6 40.0 - 53.0 %    MCV 95 78 - 100 fL    MCH 32.7 26.5 - 33.0 pg    MCHC 34.5 31.5 - 36.5 g/dL    RDW 12.3 10.0 - 15.0 %    Platelet Count 210 150 - 450 10e3/uL   Basic metabolic panel     Status: Abnormal   Result Value Ref Range    Sodium 136 136 - 145 mmol/L    Potassium 4.9 3.4 - 5.3 mmol/L    Chloride 105 98 - 107 mmol/L    Carbon Dioxide (CO2) 25 22 - 29 mmol/L    Anion Gap 6 (L) 7 - 15 mmol/L    Urea Nitrogen 12.3 8.0 - 23.0 mg/dL    Creatinine 0.86 0.67 - 1.17 mg/dL    Calcium 9.0 8.8 - 10.2 mg/dL    Glucose 140 (H) 70 - 99 mg/dL    GFR Estimate >90 >60  mL/min/1.73m2   Glucose by meter     Status: Abnormal   Result Value Ref Range    GLUCOSE BY METER POCT 139 (H) 70 - 99 mg/dL   CBC with platelets     Status: Abnormal   Result Value Ref Range    WBC Count 15.1 (H) 4.0 - 11.0 10e3/uL    RBC Count 4.45 4.40 - 5.90 10e6/uL    Hemoglobin 14.5 13.3 - 17.7 g/dL    Hematocrit 43.2 40.0 - 53.0 %    MCV 97 78 - 100 fL    MCH 32.6 26.5 - 33.0 pg    MCHC 33.6 31.5 - 36.5 g/dL    RDW 12.4 10.0 - 15.0 %    Platelet Count 200 150 - 450 10e3/uL   Glucose by meter     Status: Abnormal   Result Value Ref Range    GLUCOSE BY METER POCT 151 (H) 70 - 99 mg/dL   Glucose by meter     Status: Abnormal   Result Value Ref Range    GLUCOSE BY METER POCT 130 (H) 70 - 99 mg/dL   Partial thromboplastin time     Status: Abnormal   Result Value Ref Range    aPTT 40 (H) 22 - 38 Seconds   Glucose by meter     Status: Abnormal   Result Value Ref Range    GLUCOSE BY METER POCT 219 (H) 70 - 99 mg/dL   Partial thromboplastin time     Status: Abnormal   Result Value Ref Range    aPTT 57 (H) 22 - 38 Seconds   Glucose by meter     Status: Abnormal   Result Value Ref Range    GLUCOSE BY METER POCT 173 (H) 70 - 99 mg/dL   Glucose by meter     Status: Abnormal   Result Value Ref Range    GLUCOSE BY METER POCT 164 (H) 70 - 99 mg/dL   Blood Culture Line, venous     Status: Normal (Preliminary result)    Specimen: Line, venous; Blood   Result Value Ref Range    Culture No growth after 1 day    Blood Culture Arm, Left     Status: Normal (Preliminary result)    Specimen: Arm, Left; Blood   Result Value Ref Range    Culture No growth after 1 day    MRSA MSSA PCR, Nasal Swab     Status: None    Specimen: Nose; Swab   Result Value Ref Range    MRSA Target DNA Negative Negative    SA Target DNA Negative     Narrative    The [employer]  Xpert SA Nasal Complete assay performed in the [employer] System is a qualitative in vitro diagnostic test designed for rapid detection of Staphylococcus aureus (SA) and  methicillin-resistant Staphylococcus aureus (MRSA) from nasal swabs in patients at risk for nasal colonization. The test utilizes automated real-time polymerase chain reaction (PCR) to detect MRSA/SA DNA. The Xpert SA Nasal Complete assay is intended to aid in the prevention and control of MRSA/SA infections in healthcare settings. The assay is not intended to diagnose, guide or monitor treatment for MRSA/SA infections, or provide results of susceptibility to methicillin. A negative result does not preclude MRSA/SA nasal colonization.    CBC + differential     Status: Abnormal    Narrative    The following orders were created for panel order CBC + differential.  Procedure                               Abnormality         Status                     ---------                               -----------         ------                     CBC with platelets and d...[[id number]]  Abnormal            Final result                 Please view results for these tests on the individual orders.          Attestation:  I have reviewed today's vital signs, notes, medications, labs and imaging with Dr. Daniel.  Amount of time performed on this consult: 55 minutes.    Adry Cruz PA-C

## 2023-09-22 ENCOUNTER — APPOINTMENT (OUTPATIENT)
Dept: PHYSICAL THERAPY | Facility: CLINIC | Age: 63
End: 2023-09-22
Attending: PHYSICIAN ASSISTANT
Payer: COMMERCIAL

## 2023-09-22 VITALS
TEMPERATURE: 99.7 F | BODY MASS INDEX: 25.1 KG/M2 | HEIGHT: 67 IN | DIASTOLIC BLOOD PRESSURE: 78 MMHG | WEIGHT: 159.9 LBS | SYSTOLIC BLOOD PRESSURE: 129 MMHG | RESPIRATION RATE: 16 BRPM | OXYGEN SATURATION: 97 % | HEART RATE: 70 BPM

## 2023-09-22 LAB
ANION GAP SERPL CALCULATED.3IONS-SCNC: 12 MMOL/L (ref 7–15)
APTT PPP: 38 SECONDS (ref 22–38)
APTT PPP: 94 SECONDS (ref 22–38)
ATRIAL RATE - MUSE: 64 BPM
BUN SERPL-MCNC: 11.7 MG/DL (ref 8–23)
CALCIUM SERPL-MCNC: 9.1 MG/DL (ref 8.8–10.2)
CHLORIDE SERPL-SCNC: 102 MMOL/L (ref 98–107)
CREAT SERPL-MCNC: 0.86 MG/DL (ref 0.67–1.17)
DEPRECATED HCO3 PLAS-SCNC: 21 MMOL/L (ref 22–29)
DIASTOLIC BLOOD PRESSURE - MUSE: NORMAL MMHG
EGFRCR SERPLBLD CKD-EPI 2021: >90 ML/MIN/1.73M2
ERYTHROCYTE [DISTWIDTH] IN BLOOD BY AUTOMATED COUNT: 12.5 % (ref 10–15)
GLUCOSE BLDC GLUCOMTR-MCNC: 136 MG/DL (ref 70–99)
GLUCOSE BLDC GLUCOMTR-MCNC: 163 MG/DL (ref 70–99)
GLUCOSE BLDC GLUCOMTR-MCNC: 270 MG/DL (ref 70–99)
GLUCOSE SERPL-MCNC: 124 MG/DL (ref 70–99)
HCT VFR BLD AUTO: 45.6 % (ref 40–53)
HGB BLD-MCNC: 16.1 G/DL (ref 13.3–17.7)
HOLD SPECIMEN: NORMAL
INTERPRETATION ECG - MUSE: NORMAL
MCH RBC QN AUTO: 33.3 PG (ref 26.5–33)
MCHC RBC AUTO-ENTMCNC: 35.3 G/DL (ref 31.5–36.5)
MCV RBC AUTO: 94 FL (ref 78–100)
P AXIS - MUSE: 36 DEGREES
PLATELET # BLD AUTO: 198 10E3/UL (ref 150–450)
POTASSIUM SERPL-SCNC: 4.5 MMOL/L (ref 3.4–5.3)
PR INTERVAL - MUSE: 184 MS
QRS DURATION - MUSE: 94 MS
QT - MUSE: 416 MS
QTC - MUSE: 429 MS
R AXIS - MUSE: -48 DEGREES
RBC # BLD AUTO: 4.84 10E6/UL (ref 4.4–5.9)
SODIUM SERPL-SCNC: 135 MMOL/L (ref 136–145)
SYSTOLIC BLOOD PRESSURE - MUSE: NORMAL MMHG
T AXIS - MUSE: 48 DEGREES
TROPONIN T SERPL HS-MCNC: 15 NG/L
VANCOMYCIN SERPL-MCNC: 12.6 UG/ML
VENTRICULAR RATE- MUSE: 64 BPM
WBC # BLD AUTO: 12.4 10E3/UL (ref 4–11)

## 2023-09-22 PROCEDURE — 99239 HOSP IP/OBS DSCHRG MGMT >30: CPT | Performed by: INTERNAL MEDICINE

## 2023-09-22 PROCEDURE — 84484 ASSAY OF TROPONIN QUANT: CPT | Performed by: INTERNAL MEDICINE

## 2023-09-22 PROCEDURE — 250N000011 HC RX IP 250 OP 636: Performed by: PHYSICIAN ASSISTANT

## 2023-09-22 PROCEDURE — 36415 COLL VENOUS BLD VENIPUNCTURE: CPT | Performed by: HOSPITALIST

## 2023-09-22 PROCEDURE — 93010 ELECTROCARDIOGRAM REPORT: CPT | Performed by: INTERNAL MEDICINE

## 2023-09-22 PROCEDURE — 85730 THROMBOPLASTIN TIME PARTIAL: CPT | Performed by: INTERNAL MEDICINE

## 2023-09-22 PROCEDURE — 36415 COLL VENOUS BLD VENIPUNCTURE: CPT | Performed by: INTERNAL MEDICINE

## 2023-09-22 PROCEDURE — 258N000003 HC RX IP 258 OP 636: Performed by: INTERNAL MEDICINE

## 2023-09-22 PROCEDURE — 250N000013 HC RX MED GY IP 250 OP 250 PS 637: Performed by: INTERNAL MEDICINE

## 2023-09-22 PROCEDURE — 97161 PT EVAL LOW COMPLEX 20 MIN: CPT | Mod: GP | Performed by: PHYSICAL THERAPIST

## 2023-09-22 PROCEDURE — 250N000011 HC RX IP 250 OP 636: Mod: JZ | Performed by: HOSPITALIST

## 2023-09-22 PROCEDURE — 97116 GAIT TRAINING THERAPY: CPT | Mod: GP | Performed by: PHYSICAL THERAPIST

## 2023-09-22 PROCEDURE — 85027 COMPLETE CBC AUTOMATED: CPT | Performed by: HOSPITALIST

## 2023-09-22 PROCEDURE — 80202 ASSAY OF VANCOMYCIN: CPT | Performed by: HOSPITALIST

## 2023-09-22 PROCEDURE — 97530 THERAPEUTIC ACTIVITIES: CPT | Mod: GP | Performed by: PHYSICAL THERAPIST

## 2023-09-22 PROCEDURE — 99207 PR NO BILLABLE SERVICE THIS VISIT: CPT | Performed by: INTERNAL MEDICINE

## 2023-09-22 PROCEDURE — 250N000011 HC RX IP 250 OP 636: Performed by: INTERNAL MEDICINE

## 2023-09-22 PROCEDURE — 250N000013 HC RX MED GY IP 250 OP 250 PS 637: Performed by: PHYSICIAN ASSISTANT

## 2023-09-22 PROCEDURE — 80048 BASIC METABOLIC PNL TOTAL CA: CPT | Performed by: HOSPITALIST

## 2023-09-22 RX ORDER — NICOTINE POLACRILEX 4 MG
15-30 LOZENGE BUCCAL
Status: DISCONTINUED | OUTPATIENT
Start: 2023-09-22 | End: 2023-09-22 | Stop reason: HOSPADM

## 2023-09-22 RX ORDER — SPIRONOLACTONE 25 MG/1
25 TABLET ORAL DAILY
Status: DISCONTINUED | OUTPATIENT
Start: 2023-09-22 | End: 2023-09-22 | Stop reason: HOSPADM

## 2023-09-22 RX ORDER — DEXTROSE MONOHYDRATE 25 G/50ML
25-50 INJECTION, SOLUTION INTRAVENOUS
Status: DISCONTINUED | OUTPATIENT
Start: 2023-09-22 | End: 2023-09-22 | Stop reason: HOSPADM

## 2023-09-22 RX ADMIN — NICOTINE 1 PATCH: 14 PATCH, EXTENDED RELEASE TRANSDERMAL at 08:49

## 2023-09-22 RX ADMIN — HEPARIN SODIUM 1200 UNITS/HR: 10000 INJECTION, SOLUTION INTRAVENOUS at 01:49

## 2023-09-22 RX ADMIN — PIPERACILLIN AND TAZOBACTAM 3.38 G: 3; .375 INJECTION, POWDER, FOR SOLUTION INTRAVENOUS at 04:02

## 2023-09-22 RX ADMIN — HYDROCODONE BITARTRATE AND ACETAMINOPHEN 1 TABLET: 5; 325 TABLET ORAL at 08:55

## 2023-09-22 RX ADMIN — LEVOTHYROXINE SODIUM 137 MCG: 137 TABLET ORAL at 08:46

## 2023-09-22 RX ADMIN — SPIRONOLACTONE 25 MG: 25 TABLET ORAL at 14:29

## 2023-09-22 RX ADMIN — AMLODIPINE BESYLATE 10 MG: 10 TABLET ORAL at 08:47

## 2023-09-22 RX ADMIN — VANCOMYCIN HYDROCHLORIDE 1250 MG: 10 INJECTION, POWDER, LYOPHILIZED, FOR SOLUTION INTRAVENOUS at 10:05

## 2023-09-22 RX ADMIN — CEFAZOLIN 1 G: 1 INJECTION, POWDER, FOR SOLUTION INTRAMUSCULAR; INTRAVENOUS at 05:58

## 2023-09-22 RX ADMIN — POLYETHYLENE GLYCOL 3350 17 G: 17 POWDER, FOR SOLUTION ORAL at 08:47

## 2023-09-22 RX ADMIN — PIPERACILLIN AND TAZOBACTAM 3.38 G: 3; .375 INJECTION, POWDER, FOR SOLUTION INTRAVENOUS at 08:45

## 2023-09-22 RX ADMIN — PIPERACILLIN AND TAZOBACTAM 3.38 G: 3; .375 INJECTION, POWDER, FOR SOLUTION INTRAVENOUS at 14:29

## 2023-09-22 RX ADMIN — SENNOSIDES AND DOCUSATE SODIUM 1 TABLET: 50; 8.6 TABLET ORAL at 08:46

## 2023-09-22 RX ADMIN — ROSUVASTATIN CALCIUM 5 MG: 5 TABLET, FILM COATED ORAL at 08:46

## 2023-09-22 ASSESSMENT — ACTIVITIES OF DAILY LIVING (ADL)
ADLS_ACUITY_SCORE: 21
ADLS_ACUITY_SCORE: 23
ADLS_ACUITY_SCORE: 21
ADLS_ACUITY_SCORE: 21
ADLS_ACUITY_SCORE: 23
ADLS_ACUITY_SCORE: 21

## 2023-09-22 NOTE — PROVIDER NOTIFICATION
Provider notified:  Pt reported of chest pain, his is not sure if he pulled muscle or what, it is in mid chest, not radiate anywhere. VS /83, 78, 97 0n RA T98.1 Pt not in distress.

## 2023-09-22 NOTE — ANESTHESIA CARE TRANSFER NOTE
Patient: Jae Meredith    Procedure: Procedure(s):  AMPUTATION, GREAT TOE       Diagnosis: Osteomyelitis of great toe of left foot (H) [M86.9]  Diagnosis Additional Information: No value filed.    Anesthesia Type:   MAC     Note:    Oropharynx: oral airway in place  Level of Consciousness: awake  Oxygen Supplementation: face mask  Level of Supplemental Oxygen (L/min / FiO2): 6  Independent Airway: airway patency satisfactory and stable  Dentition: dentition unchanged  Vital Signs Stable: post-procedure vital signs reviewed and stable  Report to RN Given: handoff report given  Patient transferred to: PACU    Handoff Report: Identifed the Patient, Identified the Reponsible Provider, Reviewed the pertinent medical history, Discussed the surgical course, Reviewed Intra-OP anesthesia mangement and issues during anesthesia, Set expectations for post-procedure period and Allowed opportunity for questions and acknowledgement of understanding      Vitals:  Vitals Value Taken Time   /62    Temp 37    Pulse 54 09/21/23 1939   Resp 12 09/21/23 1939   SpO2 98 % 09/21/23 1939   Vitals shown include unvalidated device data.    Electronically Signed By: LEXI Perez CRNA  September 21, 2023  7:40 PM

## 2023-09-22 NOTE — PLAN OF CARE
Goal Outcome Evaluation:      Plan of Care Reviewed With: patient      [id number] RN    Patient vital signs are at baseline: Yes RA  Patient able to ambulate as they were prior to admission or with assist devices provided by therapies during their stay:  No,  Reason:  SBA with ortho shoe on. Heel WB to LLE  Patient MUST void prior to discharge:  Yes  Patient able to tolerate oral intake:  Yes  Pain has adequate pain control using Oral analgesics:  Yes pain managed with prn Norco  Does patient have an identified :  Yes  Has goal D/C date and time been discussed with patient:  No,  Reason:  plan is transfer to [facility] when bed available. Pt is not happy about that he wants to discharge home then go to [facility].   BG monitoring 124, 163 and +++ pt refusing to take insulin. Education about why we check and use insulin with pt. Heparin drip at 1807 Bolus and dose increased. Recheck Ptt at 1400 94. Hold heparin for 60 minutes then reduce gtt by 200 units. At 1525 pt refused the rest of his medications and cares and took off his telemetry.  Remote tele SR when it was on.     MD and ortho came and talked with pt. Pt is leaving AMA. Pt refused to sign AMA form and discharge form. Wife took pt home with abx script in hand and dressing supplies given to them

## 2023-09-22 NOTE — ANESTHESIA POSTPROCEDURE EVALUATION
Patient: Jae Meredith    Procedure: Procedure(s):  AMPUTATION, GREAT TOE       Anesthesia Type:  MAC    Note:  Disposition: Inpatient   Postop Pain Control: Uneventful            Sign Out: Well controlled pain   PONV: No   Neuro/Psych: Uneventful            Sign Out: Acceptable/Baseline neuro status   Airway/Respiratory: Uneventful            Sign Out: Acceptable/Baseline resp. status   CV/Hemodynamics: Uneventful            Sign Out: Acceptable CV status; No obvious hypovolemia; No obvious fluid overload   Other NRE:    DID A NON-ROUTINE EVENT OCCUR? No           Last vitals:  Vitals Value Taken Time   /83 09/22/23 0100   Temp 98.1  F (36.7  C) 09/22/23 0100   Pulse 78 09/22/23 0100   Resp 16 09/22/23 0100   SpO2 98 % 09/22/23 0100       Electronically Signed By: Cristin Rider MD  September 22, 2023  6:15 AM

## 2023-09-22 NOTE — CONSULTS
Care Management Follow Up    Length of Stay (days): 3    Expected Discharge Date: 09/23/2023     Concerns to be Addressed:   no concerns at this time  Patient plan of care discussed at interdisciplinary rounds: Yes    Anticipated Discharge Disposition:  transfer to another hospital       Additional Information:  SW/CM consulted for discharge planning.  Pt will be transferring to [facility] when bed is available.    Pt is doesn't have insurance and has me with financial counselor.  Pt reports they're going to obtain [employer].    ANA Madden, [facility]  Inpatient Care Coordination  [facility]  [phone]

## 2023-09-22 NOTE — PROGRESS NOTES
09/22/23 0833   Appointment Info   Signing Clinician's Name / Credentials (PT) Vera Campbell DPT   Rehab Comments (PT) Heel wt bearing   Living Environment   People in Home spouse   Current Living Arrangements house   Home Accessibility stairs to enter home;stairs within home   Number of Stairs, Main Entrance 1   Number of Stairs, Within Home, Primary six   Stair Railings, Within Home, Primary railings safe and in good condition   General Information   Weight-Bearing Status - LLE   (Heel wt bearing)   Cognition   Affect/Mental Status (Cognition) WNL   Orientation Status (Cognition) oriented x 4   Follows Commands (Cognition) WNL   Pain Assessment   Patient Currently in Pain Yes, see Vital Sign flowsheet   Integumentary/Edema   Integumentary/Edema Comments L foot ACE bandaged   Strength (Manual Muscle Testing)   Strength (Manual Muscle Testing) Able to perform R SLR;Able to perform L SLR;Deficits observed during functional mobility   Bed Mobility   Comment, (Bed Mobility) CGA   Transfers   Comment, (Transfers) SBA with FWW   Gait/Stairs (Locomotion)   Comment, (Gait/Stairs) SBA with FWW   Balance   Balance Comments fair standing balance with Heel wt bearing   Clinical Impression   Criteria for Skilled Therapeutic Intervention Yes, treatment indicated   PT Diagnosis (PT) decreased functional mobility   Influenced by the following impairments decreased wt bearing, decreased functional strength, pain   Functional limitations due to impairments decreased ambulation, transfers, bed mob   Clinical Presentation (PT Evaluation Complexity) Stable/Uncomplicated   Clinical Presentation Rationale improving   Clinical Decision Making (Complexity) low complexity   Planned Therapy Interventions (PT) balance training;bed mobility training;gait training;home exercise program;patient/family education;transfer training;risk factor education;home program guidelines;progressive activity/exercise;stair training   Anticipated Equipment  Needs at Discharge (PT) shower chair;walker, rolling;crutches, axillary   Risk & Benefits of therapy have been explained evaluation/treatment results reviewed;care plan/treatment goals reviewed;risks/benefits reviewed;current/potential barriers reviewed;participants voiced agreement with care plan;participants included;patient   PT Total Evaluation Time   PT Eval, Low Complexity Minutes (71610) 5   Physical Therapy Goals   PT Frequency Daily   PT Predicted Duration/Target Date for Goal Attainment 09/23/23   PT Goals Transfers;Gait;Bed Mobility   PT: Bed Mobility Independent;Supine to/from sit   PT: Transfers Modified independent;Sit to/from stand;Bed to/from chair;Assistive device   PT: Gait Modified independent;Rolling walker;50 feet   Interventions   Interventions Quick Adds Therapeutic Activity;Gait Training   Therapeutic Activity   Therapeutic Activities: dynamic activities to improve functional performance Minutes (30771) 13   Treatment Detail/Skilled Intervention Pt was cued for supine to sit, able to perform better with cues, SBA. Pt was cued for sit<>stand with FWW. From W/C and bed. Pt was cued for improving hand placement, still pulling up on walker despite cues. Pt somewhat lethargic from pain meds bc pt does deplay good insight otherwise, asking appropriate questions and engaged re: education surrounding walker, crutches and knee scooter.   Gait Training   Gait Training Minutes (18295) 12   Symptoms Noted During/After Treatment (Gait Training) fatigue   Treatment Detail/Skilled Intervention Pt was cued for heel wt bearing with ambulation, improving with cues, but limited ability to completely unwt due to surgical cynthia not having a rocker bottom. 20 feet. Pt was cued for stairs training with rail and crutch visual and verbal cues. CGA/sba.   PT Discharge Planning   PT Plan Transfers- improve mechanics,  increase ambulation, bed mob   PT Discharge Recommendation (DC Rec) home with assist   PT Rationale for  DC Rec Pt close to meeting basic mobility goals, if would DC today from medical perspective no concerns as good insight, but fatigue was preventing longer distances today.   PT Brief overview of current status Limited ambulation due to fatigue, but performing stairs and transfers   Total Session Time   Timed Code Treatment Minutes 25   Total Session Time (sum of timed and untimed services) 30

## 2023-09-22 NOTE — PROGRESS NOTES
Orthopedic Surgery  Jae Meredith  09/22/2023     Admit Date:  9/19/2023    POD: 1 Day Post-Op   Procedure(s):  Left great toe amputation with metatarsophalangeal disarticulation     Patient up ad harish. Working with PT this morning. I personally provided post op shoe this morning from supply center.   Pain controlled.  Tolerating oral intake.    Denies nausea or vomiting  Denies chest pain or shortness of breath    Temp:  [96.9  F (36.1  C)-99.7  F (37.6  C)] 99.7  F (37.6  C)  Pulse:  [55-78] 70  Resp:  [12-18] 16  BP: (107-170)/() 129/78  SpO2:  [94 %-99 %] 97 %    Alert and oriented  Dressing is clean, dry, and intact.   Minimal erythema of the surrounding skin. Proximal to dressing   Bilateral calves are soft, non-tender.  Left lower extremity is NVI  Patient able to resist dorsi and plantar flexion bilaterally      Labs:  Recent Labs   Lab Test 09/22/23  0652 09/21/23  1202 09/20/23  0940   WBC 12.4* 11.1* 15.1*   HGB 16.1 16.4 14.5    198 200     No lab results found.  Recent Labs   Lab Test 09/21/23  1202   CRPI 10.58*         1. PLAN:   DVT prophylaxis per primary team, currently on heparin   Mobilize with PT/OT    WBAT with post op shoe, weight on heel.     Continue current pain regimen.   Dressings: Keep intact.  Change if >60% saturated or peeling off. Ortho PA to complete first dressing change on POD2   Follow-up: 2 weeks post-op with Laina Sun PA-C/Dr. Marco Daniel     2. Disposition   Anticipate transfer to [facility] when bed is available, pending additional consults at this time.      Lesia Christianson PA-C

## 2023-09-22 NOTE — PROGRESS NOTES
Had pt before and after L great toe amp. Before procedure pt NPO, hep gtt stopped at 1325 for procedure. Pt up ad harish in room, voiding adequately. 1600 . Pt received PRN norco x 1 for L toe pain which was effective. After procedure, pt able to pivot transfer into bed from PACU cart. VSS WNL w/ ex hypertension. Pt initially calm and cooperative after procedure, became frustrated at 2230 r/t IV pump and freq VS monitoring. Pt removed capno, refusing. This writer educated pt on importance of VS monitoring and the need of IV abx tx. Pt voided, ambulated to bathroom with assist of 1 and walker. Educated pt on not wt bearing of LLE, pt was somehwat compliant while walking to bathroom. Able to void, refused using urinal. Pt had scheduled tylenol and oxy PRN ordered, changed to PRN norco q6h without scheduled tylenol. Pt on IV zosyn, ancef, and vanco. Per hospitalist and dr. Daniel hep gtt to be restarted at 0200. Planned transfer to [facility] for vascular surgery when bed available.

## 2023-09-22 NOTE — PHARMACY-VANCOMYCIN DOSING SERVICE
"Pharmacy Vancomycin Note  Date of Service 2023  Patient's  1960   63 year old, male    Indication: Skin and Soft Tissue Infection  Day of Therapy: 3  Current vancomycin regimen:  1000 mg IV q12h  Current vancomycin monitoring method: AUC  Current vancomycin therapeutic monitoring goal: 400-600 mg*h/L    Insight[employer] Prediction of Current Vancomycin Regimen  Loading dose: N/A  Regimen: 1000 mg IV every 12 hours.  Start time: 09:39 on 2023  Exposure target: AUC24 (range)400-600 mg/L.hr   AUC24,ss: 439 mg/L.hr  Probability of AUC24 > 400: 70 %  Ctrough,ss: 12.4 mg/L  Probability of Ctrough,ss > 20: 3 %  Probability of nephrotoxicity (Lodise KOKI ): 8 %      Current estimated CrCl = Estimated Creatinine Clearance: 89 mL/min (based on SCr of 0.86 mg/dL).    Creatinine for last 3 days  2023:  5:38 PM Creatinine 0.79 mg/dL  2023:  6:53 AM Creatinine 0.86 mg/dL  2023: 12:02 PM Creatinine 0.88 mg/dL  2023:  6:52 AM Creatinine 0.86 mg/dL    Recent Vancomycin Levels (past 3 days)  2023:  6:52 AM Vancomycin 12.6 ug/mL    Vancomycin IV Administrations (past 72 hours)                     vancomycin (VANCOCIN) 1,000 mg in 200 mL dextrose intermittent infusion (mg) 1,000 mg New Bag 23     1,000 mg New Bag  1056     1,000 mg New Bag 23     1,000 mg New Bag  1006    vancomycin (VANCOCIN) 1,750 mg in 0.9% NaCl 500 mL intermittent infusion (mg) 1,750 mg Given 23                    Nephrotoxins and other renal medications (From now, onward)      Start     Dose/Rate Route Frequency Ordered Stop    23 0900  piperacillin-tazobactam (ZOSYN) 3.375 g vial to attach to  mL bag        Note to Pharmacy: For [facility], [facility] and [facility]: For Zosyn-naive patients, use the \"Zosyn initial dose + extended infusion\" order panel.    3.375 g  over 30 Minutes Intravenous EVERY 6 HOURS 23 0801      23 0800  [Held by provider]  empagliflozin (JARDIANCE) tablet " 25 mg        (Held by provider since Wed 9/20/2023 at 0824 by Naresh Arredondo DO.Hold Reason: OtherHold Comments: Surgery)   Note to Pharmacy: PTA Sig:Take 25 mg by mouth      25 mg Oral DAILY 09/19/23 2056                 Contrast Orders - past 72 hours (72h ago, onward)      Start     Dose/Rate Route Frequency Stop    09/20/23 0830  gadobutrol (GADAVIST) injection 7 mL         7 mL Intravenous ONCE 09/20/23 0813            Interpretation of levels and current regimen:  Vancomycin level is reflective of -600    Has serum creatinine changed greater than 50% in last 72 hours: No    Urine output:  unable to determine    Renal Function: Stable    InsightRX Prediction of Planned New Vancomycin Regimen  Loading dose: N/A  Regimen: 1250 mg IV every 12 hours.  Start time: 09:39 on 09/22/2023  Exposure target: AUC24 (range)400-600 mg/L.hr   AUC24,ss: 547 mg/L.hr  Probability of AUC24 > 400: 96 %  Ctrough,ss: 15.7 mg/L  Probability of Ctrough,ss > 20: 17 %  Probability of nephrotoxicity (Lodise KOKI 2009): 11 %      Plan:  Increase Dose to 1250 mg IV q12h  Vancomycin monitoring method: AUC  Vancomycin therapeutic monitoring goal: 400-600 mg*h/L  Pharmacy will check vancomycin levels as appropriate in 1-3 Days.  Serum creatinine levels will be ordered a minimum of twice weekly.    Adry Herrera [facility]

## 2023-09-22 NOTE — OP NOTE
Preoperative diagnosis: Infected, necrotic left great toe with osteomyelitis    Postoperative diagnosis: Same    Procedure performed: Left great toe amputation with MTP disarticulation    Surgeon Yazan Daniel     Assistant Ro Fong PA-C    Anesthesia: MAC with local    Estimated blood loss 10 cc    Tourniquet time approximately 20 minutes    Complications none    Specimens: Great toe    Dictation of operation: The patient was taken to the operating room where ongoing antibiotic treatment was given as well as sedation and a field block proximal to the amputation site.  Prep and drape was accomplished.  A fishmouth incision was made and a disarticulation was done at the MTP joint.  All necrotic skin was excised.  The metatarsal head had normal-appearing cartilage and normal strength of bone.  Further debridement was not done.  The vascular structures were cauterized and the nerves were cut sharply.  The tendons were cut.  Copious irrigation was performed followed by a layered closure.  A sterile bandage was applied.  There were no complications.

## 2023-09-22 NOTE — PLAN OF CARE
Pt is alert and oriented x4, SBA with transfers, pt needs reminders regarding heel touch wb. Pt was cooperative with cares. 2 PIV replaced this noc. Heparin started at 1200 at 2 am. Zosyn, Ancef given. No pain. VSS on RA. ACE wrap cdi. Discharge plan TBD.

## 2023-09-23 NOTE — PLAN OF CARE
Physical Therapy Discharge Summary    Reason for therapy discharge:    Discharged to home.    Progress towards therapy goal(s). See goals on Care Plan in [facility] electronic health record for goal details.  Goals not met.  Barriers to achieving goals:   discharge from facility.    Therapy recommendation(s):    No further therapy is recommended.

## 2023-09-23 NOTE — PROGRESS NOTES
Vascular surgery was requested by Ms. Lesia Christianson for an opinion regarding his peripheral arterial disease.  Patient underwent a great toe amputation of the left foot for osteomyelitis.  I reviewed the imaging.  Patient clearly has peripheral artery disease.  Do not feel the need for an inpatient transfer and the patient can be discharged home from our standpoint and vascular surgery will see him early in the next week as an outpatient.

## 2023-09-24 LAB
BACTERIA BLD CULT: NO GROWTH
BACTERIA BLD CULT: NO GROWTH

## 2023-09-25 ENCOUNTER — TELEPHONE (OUTPATIENT)
Dept: OTHER | Facility: CLINIC | Age: 63
End: 2023-09-25
Payer: COMMERCIAL

## 2023-09-25 LAB
PATH REPORT.COMMENTS IMP SPEC: NORMAL
PATH REPORT.COMMENTS IMP SPEC: NORMAL
PATH REPORT.FINAL DX SPEC: NORMAL
PATH REPORT.GROSS SPEC: NORMAL
PATH REPORT.MICROSCOPIC SPEC OTHER STN: NORMAL
PATH REPORT.RELEVANT HX SPEC: NORMAL
PHOTO IMAGE: NORMAL

## 2023-09-25 NOTE — TELEPHONE ENCOUNTER
Routing to  to coordinate in clinic consult with Dr. Crowe either today (9/25/23) or tomorrow after 10:00 a.m. (9/26/23).     Appt note: new pt (follow up from inpatient consult); Hx of PAD, left great toe amputation for osteomyelitis. (CLARISSE and US LE arterial Left in EPIC).     AMADO Altman, RN  [facility]  Office:  [phone] Fax: [phone]

## 2023-09-25 NOTE — TELEPHONE ENCOUNTER
----- Message from Herb Crowe MD sent at 9/22/2023  6:38 PM CDT -----  Regarding: PAD  Need to see this patient on either Monday or Tuesday.  Miguel

## 2023-09-26 NOTE — TELEPHONE ENCOUNTER
"Patients two phones are invalid. Patients spouse phone has \"a voicemail that has not been set up yet\".    Call 1 of 2  "

## 2023-09-29 NOTE — TELEPHONE ENCOUNTER
Second call out to patient. Left vm requesting pt to call back and schedule new pt in clinic OV with Dr. Crowe either Monday 10/2 in [place] or Thursday 10/5 in [place].    (Okay to add on additional visit these days, anytime prior to 230pm).     CORINNE AltmanN, RN  [facility]  Office:  [phone] Fax: [phone]

## 2023-09-29 NOTE — TELEPHONE ENCOUNTER
Called pt back, pt reports he already sees a vascular surgeon:   Dr. Fernández. Pt had appt scheduled with Dr. Barnhart yesterday but cancelled as he did not know why he was following up earlier than previously requested November follow up.   Writer strongly advised pt to call Dr. Barnhart's office and verify if he needs to be seen sooner for follow up. Pt verbalized understanding.     Routing to Dr. Crowe as fyi only.     AMADO Altman, RN  [facility]  Office:  [phone] Fax: [phone]

## 2023-09-30 NOTE — DISCHARGE SUMMARY
"[facility]  Hospitalist Discharge Summary      Date of Admission:  9/19/2023  Date of Discharge:  9/22/2023  5:58 PM  Discharging Provider: Jefry Mckeon DO  Discharge Service: Hospitalist Service    Discharge Diagnoses   Left first toe osteomyelitis.  Sepsis due to osteomyelitis.  Left foot cellulitis.  Lactic acidosis.  High-grade left proximal SFA stenosis.  Severe peripheral arterial disease.    Hyperlipidemia.  Coagulation defect due to chronic DOAC use.  Diabetes mellitus type 2.  Peripheral neuropathy.  Hypothyroidism.  Hypertension.  Hyponatremia.  Tobacco use disorder.  Primary aldosteronism.      Clinically Significant Risk Factors     # Overweight: Estimated body mass index is 25.04 kg/m  as calculated from the following:    Height as of this encounter: 1.702 m (5' 7\").    Weight as of this encounter: 72.5 kg (159 lb 14.4 oz).       Follow-ups Needed After Discharge   Follow-up with orthopedic surgery within 2 weeks.  Follow-up with vascular surgery within 1 week.  Follow-up with primary care provider within 2 weeks.    Discharge Disposition   Discharged to home    Hospital Course   Jae Meredith is a 63 year old male w/PMH DMT2, polyneuropathy related to diabetes, hypertension, hypothyroidism, hyper triglyceridemia, severe peripheral arterial disease s/p multiple stents.  He presented to emergency room with left foot pain and worsening left first toe infection.  He had been started on oral doxycycline approximately a week prior to admission for left toe infection.  Appearance worsening despite oral doxycycline.  Initially started on antibiotics with IV vancomycin and ceftriaxone.  He has been seen in consultation by podiatry and orthopedic surgery during hospital stay.  Has undergone left first toe amputation.  He is also been noted to have high-grade vascular stenosis on the left.  His vascular surgery team at [facility] contacted.  He has been accepted in " transfer to [facility] when a bed is available.  No bed available for transfer.  He did undergo left first toe amputation on 9/21/2023 with orthopedic surgery.  He did well in the postop period.  He did become insistent on leaving the hospital on 9/22.  Orthopedic surgery asked vascular surgery to weigh in on possible discharge due to patient's insistence.  Vascular surgery did feel that he could follow-up in the outpatient setting, since he is already undergone left first toe amputation.  He had been on continuous IV heparin infusion.  He had previously been on IV Zosyn and vancomycin.  An MRSA PCR was negative.  He was switched to IV Zosyn.  At discharge, he was continued on Augmentin until follow-up.  IV heparin infusion was stopped.  Apixaban restarted.  Does need to follow-up with vascular surgery within 1 week.  He did have an appointment scheduled for 4 days after discharge.  He is to keep this appointment.  He also needs to follow-up with orthopedic surgery within 2 weeks.    Consultations This Hospital Stay   PHARMACY TO DOSE VANCO  PODIATRY IP CONSULT  VIRTUAL VASCULAR SURGERY ADULT IP CONSULT  PHARMACY IP CONSULT  PHARMACY TO DOSE VANCO  VIRTUAL VASCULAR SURGERY ADULT IP CONSULT  ORTHOPEDIC SURGERY IP CONSULT  CARE MANAGEMENT / SOCIAL WORK IP CONSULT  PHYSICAL THERAPY ADULT IP CONSULT  OCCUPATIONAL THERAPY ADULT IP CONSULT  VIRTUAL VASCULAR SURGERY ADULT IP CONSULT    Code Status   Prior           [facility]  [address] [facility] [address]  Phone: [phone]  Fax: [phone]  ______________________________________________________________________      Primary Care Physician   [facility]    Discharge Orders      Rolling Knee Walker DME    DME Documentation: Describe the reason for need to support medical necessity: Impaired gait due to Foot/Ankle surgery. Anticipated length of need: 3 months     Walker DME     DME Documentation: Describe the reason for need to support medical necessity: Impaired gait due to Foot/Ankle surgery. Anticipated length of need: 3 months     Miscellaneous DME    DME Documentation:   Describe the reason for need to support medical necessity:  great toe amp.     I, the undersigned, certify that the above prescribed supplies are medically necessary for this patient and is both reasonable and necessary in reference to accepted standards of medical and necessary in reference to accepted standards of medical practice in the treatment of this patient's condition and is not prescribed as a convenience.           Discharge Medications   Discharge Medication List as of 9/22/2023  5:18 PM        START taking these medications    Details   amoxicillin-clavulanate (AUGMENTIN) 875-125 MG tablet Take 1 tablet by mouth 2 times daily for 14 days, Disp-28 tablet, R-0, E-Prescribe           CONTINUE these medications which have NOT CHANGED    Details   amLODIPine (NORVASC) 5 MG tablet Take 5 mg by mouth daily, Historical      apixaban ANTICOAGULANT (ELIQUIS) 5 MG tablet Take 5 mg by mouth 2 times daily, Historical      empagliflozin (JARDIANCE) 25 MG TABS tablet Take 25 mg by mouth daily, Historical      !! gabapentin (NEURONTIN) 600 MG tablet Take 1,200 mg by mouth At Bedtime, Historical      !! gabapentin (NEURONTIN) 600 MG tablet Take 600 mg by mouth daily as needed, Historical      levothyroxine (SYNTHROID/LEVOTHROID) 137 MCG tablet Take 137mcg daily and add 1/2 tab once per week (Wed), Historical      rosuvastatin (CRESTOR) 5 MG tablet Take 5 mg by mouth daily (with breakfast), Historical      spironolactone (ALDACTONE) 50 MG tablet Take 50 mg by mouth 2 times daily, Historical       !! - Potential duplicate medications found. Please discuss with provider.        STOP taking these medications       doxycycline hyclate (VIBRAMYCIN) 100 MG capsule Comments:   Reason for Stopping:         metFORMIN (GLUCOPHAGE)  500 MG tablet Comments:   Reason for Stopping:         tadalafil (ADCIRCA/CIALIS) 20 MG tablet Comments:   Reason for Stopping:             Allergies   Allergies   Allergen Reactions    Adenosine Anaphylaxis and Other (See Comments)     PN: Became unresponsive, unable to speak    Dobutamine Other (See Comments)     Fainting    Atorvastatin Muscle Pain (Myalgia)    Ibuprofen Other (See Comments)     tinnitus

## 2023-11-05 ENCOUNTER — HEALTH MAINTENANCE LETTER (OUTPATIENT)
Age: 63
End: 2023-11-05

## 2024-10-20 ENCOUNTER — HEALTH MAINTENANCE LETTER (OUTPATIENT)
Age: 64
End: 2024-10-20

## (undated) DEVICE — CAST PADDING 4" STERILE 9044S

## (undated) DEVICE — PREP CHLORAPREP 26ML TINTED ORANGE  260815

## (undated) DEVICE — APPLICATORS COTTON TIP 6"X2 STERILE LF C15053-006

## (undated) DEVICE — GLOVE BIOGEL PI SZ 7.0 40870

## (undated) DEVICE — ESU GROUND PAD ADULT W/CORD E7507

## (undated) DEVICE — GLOVE BIOGEL PI SZ 6.5 40865

## (undated) DEVICE — SU ETHILON 4-0 PS-2 18" BLACK 1667H

## (undated) DEVICE — NDL BLUNT 18GA 1" W/O FILTER 305181

## (undated) DEVICE — TRAY PREP DRY SKIN SCRUB 067

## (undated) DEVICE — GLOVE BIOGEL PI MICRO INDICATOR UNDERGLOVE SZ 7.0 48970

## (undated) DEVICE — LINEN HALF SHEET 5512

## (undated) DEVICE — PACK LOWER EXTREMITY RIDGES

## (undated) DEVICE — PREP POVIDONE IODINE SOLUTION 10% 4OZ BOTTLE 29906-004

## (undated) DEVICE — PREP DURAPREP 26ML APL 8630

## (undated) DEVICE — SU ETHILON 2-0 PS 18" 585H

## (undated) DEVICE — DRSG ABDOMINAL 07 1/2X8" 7197D

## (undated) DEVICE — GOWN XLG DISP 9545

## (undated) DEVICE — GLOVE BIOGEL PI MICRO SZ 7.5 48575

## (undated) DEVICE — BNDG ELASTIC 4"X5YDS UNSTERILE 6611-40

## (undated) DEVICE — CAST PADDING 6" STERILE 9046S

## (undated) DEVICE — BAG CLEAR TRASH 1.3M 39X33" P4040C

## (undated) DEVICE — TOURNIQUET SGL BLADDER 18"X4" RED 5921-218-135

## (undated) DEVICE — TOURNIQUET CUFF 12" STERILE 60-7070-102

## (undated) DEVICE — LINEN FULL SHEET 5511

## (undated) DEVICE — NDL 25GA 1.5" 305127

## (undated) DEVICE — SU VICRYL 2-0 CT-2 27" UND J269H

## (undated) DEVICE — SOL NACL 0.9% IRRIG 1000ML BOTTLE 2F7124

## (undated) DEVICE — BLADE KNIFE SURG 15 371115

## (undated) DEVICE — SYR 10ML FINGER CONTROL W/O NDL 309695

## (undated) DEVICE — BNDG KLING 3" 2232

## (undated) DEVICE — PREP POVIDONE-IODINE 7.5% SCRUB 4OZ BOTTLE MDS093945

## (undated) DEVICE — DRSG GAUZE 4X4" TRAY

## (undated) DEVICE — BNDG ELASTIC 3"X5YDS UNSTERILE 6611-30

## (undated) DEVICE — GLOVE BIOGEL PI SZ 7.5 40875

## (undated) DEVICE — LINEN ORTHO ACL PACK 5447

## (undated) RX ORDER — BUPIVACAINE HYDROCHLORIDE 5 MG/ML
INJECTION, SOLUTION EPIDURAL; INTRACAUDAL
Status: DISPENSED
Start: 2023-09-21

## (undated) RX ORDER — PROPOFOL 10 MG/ML
INJECTION, EMULSION INTRAVENOUS
Status: DISPENSED
Start: 2023-09-20

## (undated) RX ORDER — LIDOCAINE HYDROCHLORIDE 10 MG/ML
INJECTION, SOLUTION EPIDURAL; INFILTRATION; INTRACAUDAL; PERINEURAL
Status: DISPENSED
Start: 2023-09-21

## (undated) RX ORDER — ONDANSETRON 2 MG/ML
INJECTION INTRAMUSCULAR; INTRAVENOUS
Status: DISPENSED
Start: 2023-09-21

## (undated) RX ORDER — PROPOFOL 10 MG/ML
INJECTION, EMULSION INTRAVENOUS
Status: DISPENSED
Start: 2023-09-21

## (undated) RX ORDER — FENTANYL CITRATE 50 UG/ML
INJECTION, SOLUTION INTRAMUSCULAR; INTRAVENOUS
Status: DISPENSED
Start: 2023-09-20

## (undated) RX ORDER — GLYCOPYRROLATE 0.2 MG/ML
INJECTION INTRAMUSCULAR; INTRAVENOUS
Status: DISPENSED
Start: 2023-09-21

## (undated) RX ORDER — FENTANYL CITRATE 50 UG/ML
INJECTION, SOLUTION INTRAMUSCULAR; INTRAVENOUS
Status: DISPENSED
Start: 2023-09-21